# Patient Record
Sex: FEMALE | Race: WHITE | NOT HISPANIC OR LATINO | Employment: OTHER | RURAL
[De-identification: names, ages, dates, MRNs, and addresses within clinical notes are randomized per-mention and may not be internally consistent; named-entity substitution may affect disease eponyms.]

---

## 2021-01-27 ENCOUNTER — HISTORICAL (OUTPATIENT)
Dept: ADMINISTRATIVE | Facility: HOSPITAL | Age: 76
End: 2021-01-27

## 2021-02-24 ENCOUNTER — HISTORICAL (OUTPATIENT)
Dept: ADMINISTRATIVE | Facility: HOSPITAL | Age: 76
End: 2021-02-24

## 2021-06-14 ENCOUNTER — OFFICE VISIT (OUTPATIENT)
Dept: FAMILY MEDICINE | Facility: CLINIC | Age: 76
End: 2021-06-14
Payer: MEDICARE

## 2021-06-14 ENCOUNTER — APPOINTMENT (OUTPATIENT)
Dept: RADIOLOGY | Facility: CLINIC | Age: 76
End: 2021-06-14
Attending: NURSE PRACTITIONER
Payer: MEDICARE

## 2021-06-14 VITALS
SYSTOLIC BLOOD PRESSURE: 138 MMHG | OXYGEN SATURATION: 97 % | WEIGHT: 112.63 LBS | HEART RATE: 100 BPM | RESPIRATION RATE: 22 BRPM | HEIGHT: 62 IN | BODY MASS INDEX: 20.73 KG/M2 | DIASTOLIC BLOOD PRESSURE: 80 MMHG | TEMPERATURE: 98 F

## 2021-06-14 DIAGNOSIS — F41.9 ANXIETY: ICD-10-CM

## 2021-06-14 DIAGNOSIS — R05.9 COUGH: ICD-10-CM

## 2021-06-14 DIAGNOSIS — R07.81 PLEURITIC CHEST PAIN: ICD-10-CM

## 2021-06-14 DIAGNOSIS — J44.9 CHRONIC OBSTRUCTIVE PULMONARY DISEASE, UNSPECIFIED COPD TYPE: ICD-10-CM

## 2021-06-14 DIAGNOSIS — J44.89 COPD WITH CHRONIC BRONCHITIS AND EMPHYSEMA: ICD-10-CM

## 2021-06-14 DIAGNOSIS — J22 LRTI (LOWER RESPIRATORY TRACT INFECTION): ICD-10-CM

## 2021-06-14 DIAGNOSIS — J43.9 COPD WITH CHRONIC BRONCHITIS AND EMPHYSEMA: ICD-10-CM

## 2021-06-14 DIAGNOSIS — F17.210 NICOTINE DEPENDENCE, CIGARETTES, UNCOMPLICATED: ICD-10-CM

## 2021-06-14 DIAGNOSIS — E78.00 PURE HYPERCHOLESTEROLEMIA: Primary | ICD-10-CM

## 2021-06-14 PROCEDURE — 99214 PR OFFICE/OUTPT VISIT, EST, LEVL IV, 30-39 MIN: ICD-10-PCS | Mod: 25,,, | Performed by: NURSE PRACTITIONER

## 2021-06-14 PROCEDURE — 71046 X-RAY EXAM CHEST 2 VIEWS: CPT | Mod: TC,RHCUB,FY | Performed by: NURSE PRACTITIONER

## 2021-06-14 PROCEDURE — 96372 THER/PROPH/DIAG INJ SC/IM: CPT | Mod: ,,, | Performed by: NURSE PRACTITIONER

## 2021-06-14 PROCEDURE — 99214 OFFICE O/P EST MOD 30 MIN: CPT | Mod: 25,,, | Performed by: NURSE PRACTITIONER

## 2021-06-14 PROCEDURE — 96372 PR INJECTION,THERAP/PROPH/DIAG2ST, IM OR SUBCUT: ICD-10-PCS | Mod: ,,, | Performed by: NURSE PRACTITIONER

## 2021-06-14 RX ORDER — ALBUTEROL SULFATE 90 UG/1
2 AEROSOL, METERED RESPIRATORY (INHALATION) EVERY 4 HOURS PRN
Qty: 18 G | Refills: 1 | Status: SHIPPED | OUTPATIENT
Start: 2021-06-14 | End: 2022-09-07 | Stop reason: SDUPTHER

## 2021-06-14 RX ORDER — PREDNISONE 10 MG/1
3 TABLET ORAL EVERY MORNING
COMMUNITY
Start: 2021-04-27 | End: 2021-07-19 | Stop reason: ALTCHOICE

## 2021-06-14 RX ORDER — PRAVASTATIN SODIUM 20 MG/1
1 TABLET ORAL NIGHTLY
COMMUNITY
Start: 2021-03-08 | End: 2021-06-14 | Stop reason: SDUPTHER

## 2021-06-14 RX ORDER — VEDOLIZUMAB 300 MG/5ML
300 INJECTION, POWDER, LYOPHILIZED, FOR SOLUTION INTRAVENOUS
COMMUNITY
End: 2021-12-20

## 2021-06-14 RX ORDER — UMECLIDINIUM BROMIDE AND VILANTEROL TRIFENATATE 62.5; 25 UG/1; UG/1
1 POWDER RESPIRATORY (INHALATION) DAILY
Qty: 3 EACH | Refills: 3 | Status: SHIPPED | OUTPATIENT
Start: 2021-06-14 | End: 2021-12-20 | Stop reason: SDUPTHER

## 2021-06-14 RX ORDER — GABAPENTIN 300 MG/1
1 CAPSULE ORAL 3 TIMES DAILY
COMMUNITY
Start: 2021-05-20 | End: 2021-08-23 | Stop reason: SDUPTHER

## 2021-06-14 RX ORDER — ALBUTEROL SULFATE 90 UG/1
2 AEROSOL, METERED RESPIRATORY (INHALATION) EVERY 4 HOURS PRN
COMMUNITY
End: 2021-06-14 | Stop reason: SDUPTHER

## 2021-06-14 RX ORDER — CEFTRIAXONE 1 G/1
1 INJECTION, POWDER, FOR SOLUTION INTRAMUSCULAR; INTRAVENOUS
Status: COMPLETED | OUTPATIENT
Start: 2021-06-14 | End: 2021-06-14

## 2021-06-14 RX ORDER — PRAVASTATIN SODIUM 20 MG/1
20 TABLET ORAL NIGHTLY
Qty: 90 TABLET | Refills: 3 | Status: SHIPPED | OUTPATIENT
Start: 2021-06-14 | End: 2021-12-20 | Stop reason: SDUPTHER

## 2021-06-14 RX ORDER — AZITHROMYCIN 250 MG/1
TABLET, FILM COATED ORAL
Qty: 6 TABLET | Refills: 0 | Status: SHIPPED | OUTPATIENT
Start: 2021-06-14 | End: 2021-07-19 | Stop reason: ALTCHOICE

## 2021-06-14 RX ORDER — UMECLIDINIUM BROMIDE AND VILANTEROL TRIFENATATE 62.5; 25 UG/1; UG/1
1 POWDER RESPIRATORY (INHALATION) EVERY 4 HOURS
COMMUNITY
Start: 2021-04-27 | End: 2021-06-14 | Stop reason: SDUPTHER

## 2021-06-14 RX ORDER — DEXAMETHASONE SODIUM PHOSPHATE 4 MG/ML
6 INJECTION, SOLUTION INTRA-ARTICULAR; INTRALESIONAL; INTRAMUSCULAR; INTRAVENOUS; SOFT TISSUE
Status: COMPLETED | OUTPATIENT
Start: 2021-06-14 | End: 2021-06-14

## 2021-06-14 RX ADMIN — DEXAMETHASONE SODIUM PHOSPHATE 6 MG: 4 INJECTION, SOLUTION INTRA-ARTICULAR; INTRALESIONAL; INTRAMUSCULAR; INTRAVENOUS; SOFT TISSUE at 04:06

## 2021-06-14 RX ADMIN — CEFTRIAXONE 1 G: 1 INJECTION, POWDER, FOR SOLUTION INTRAMUSCULAR; INTRAVENOUS at 04:06

## 2021-07-14 ENCOUNTER — OFFICE VISIT (OUTPATIENT)
Dept: SPINE | Facility: CLINIC | Age: 76
End: 2021-07-14
Payer: MEDICARE

## 2021-07-14 DIAGNOSIS — M16.12 ARTHRITIS OF LEFT HIP: ICD-10-CM

## 2021-07-14 DIAGNOSIS — M16.11 ARTHRITIS OF RIGHT HIP: ICD-10-CM

## 2021-07-14 DIAGNOSIS — M54.16 LUMBAR RADICULOPATHY: Primary | ICD-10-CM

## 2021-07-14 DIAGNOSIS — M54.9 DORSALGIA, UNSPECIFIED: ICD-10-CM

## 2021-07-14 DIAGNOSIS — M51.36 DDD (DEGENERATIVE DISC DISEASE), LUMBAR: ICD-10-CM

## 2021-07-14 PROCEDURE — 99214 OFFICE O/P EST MOD 30 MIN: CPT | Mod: S$PBB,,, | Performed by: ORTHOPAEDIC SURGERY

## 2021-07-14 PROCEDURE — 99213 OFFICE O/P EST LOW 20 MIN: CPT | Mod: PBBFAC | Performed by: ORTHOPAEDIC SURGERY

## 2021-07-14 PROCEDURE — 99214 PR OFFICE/OUTPT VISIT, EST, LEVL IV, 30-39 MIN: ICD-10-PCS | Mod: S$PBB,,, | Performed by: ORTHOPAEDIC SURGERY

## 2021-07-19 ENCOUNTER — HOSPITAL ENCOUNTER (OUTPATIENT)
Dept: RADIOLOGY | Facility: HOSPITAL | Age: 76
Discharge: HOME | End: 2021-07-19
Attending: ORTHOPAEDIC SURGERY
Payer: MEDICARE

## 2021-07-19 VITALS
TEMPERATURE: 98 F | BODY MASS INDEX: 20.98 KG/M2 | OXYGEN SATURATION: 97 % | DIASTOLIC BLOOD PRESSURE: 74 MMHG | RESPIRATION RATE: 18 BRPM | SYSTOLIC BLOOD PRESSURE: 155 MMHG | HEIGHT: 62 IN | WEIGHT: 114 LBS | HEART RATE: 71 BPM

## 2021-07-19 DIAGNOSIS — M54.16 LUMBAR RADICULOPATHY: ICD-10-CM

## 2021-07-19 DIAGNOSIS — M54.9 DORSALGIA, UNSPECIFIED: ICD-10-CM

## 2021-07-19 LAB
APTT PPP: 25.8 SECONDS (ref 25.2–37.3)
BASOPHILS # BLD AUTO: 0.09 K/UL (ref 0–0.2)
BASOPHILS NFR BLD AUTO: 1 % (ref 0–1)
DIFFERENTIAL METHOD BLD: ABNORMAL
EOSINOPHIL # BLD AUTO: 0.33 K/UL (ref 0–0.5)
EOSINOPHIL NFR BLD AUTO: 3.8 % (ref 1–4)
ERYTHROCYTE [DISTWIDTH] IN BLOOD BY AUTOMATED COUNT: 13.3 % (ref 11.5–14.5)
HCT VFR BLD AUTO: 41 % (ref 38–47)
HGB BLD-MCNC: 13.5 G/DL (ref 12–16)
IMM GRANULOCYTES # BLD AUTO: 0.02 K/UL (ref 0–0.04)
IMM GRANULOCYTES NFR BLD: 0.2 % (ref 0–0.4)
INR BLD: 0.97 (ref 0.9–1.1)
LYMPHOCYTES # BLD AUTO: 2.03 K/UL (ref 1–4.8)
LYMPHOCYTES NFR BLD AUTO: 23.3 % (ref 27–41)
MCH RBC QN AUTO: 31.8 PG (ref 27–31)
MCHC RBC AUTO-ENTMCNC: 32.9 G/DL (ref 32–36)
MCV RBC AUTO: 96.5 FL (ref 80–96)
MONOCYTES # BLD AUTO: 0.73 K/UL (ref 0–0.8)
MONOCYTES NFR BLD AUTO: 8.4 % (ref 2–6)
MPC BLD CALC-MCNC: 9.7 FL (ref 9.4–12.4)
NEUTROPHILS # BLD AUTO: 5.51 K/UL (ref 1.8–7.7)
NEUTROPHILS NFR BLD AUTO: 63.3 % (ref 53–65)
NRBC # BLD AUTO: 0 X10E3/UL
NRBC, AUTO (.00): 0 %
PLATELET # BLD AUTO: 272 K/UL (ref 150–400)
PROTHROMBIN TIME: 12.9 SECONDS (ref 11.7–14.7)
RBC # BLD AUTO: 4.25 M/UL (ref 4.2–5.4)
WBC # BLD AUTO: 8.71 K/UL (ref 4.5–11)

## 2021-07-19 PROCEDURE — 85610 PROTHROMBIN TIME: CPT | Performed by: RADIOLOGY

## 2021-07-19 PROCEDURE — 25000003 PHARM REV CODE 250: Performed by: RADIOLOGY

## 2021-07-19 PROCEDURE — 72132 CT LUMBAR SPINE W/DYE: CPT | Mod: TC

## 2021-07-19 PROCEDURE — 72132 CT LUMBAR SPINE WITH CONTRAST: ICD-10-PCS | Mod: 26,,, | Performed by: RADIOLOGY

## 2021-07-19 PROCEDURE — 25500020 PHARM REV CODE 255: Performed by: ORTHOPAEDIC SURGERY

## 2021-07-19 PROCEDURE — 36415 COLL VENOUS BLD VENIPUNCTURE: CPT | Performed by: RADIOLOGY

## 2021-07-19 PROCEDURE — 85025 COMPLETE CBC W/AUTO DIFF WBC: CPT | Performed by: RADIOLOGY

## 2021-07-19 PROCEDURE — 72132 CT LUMBAR SPINE W/DYE: CPT | Mod: 26,,, | Performed by: RADIOLOGY

## 2021-07-19 PROCEDURE — 85730 THROMBOPLASTIN TIME PARTIAL: CPT | Performed by: RADIOLOGY

## 2021-07-19 PROCEDURE — 62304 MYELOGRAPHY LUMBAR INJECTION: CPT

## 2021-07-19 RX ORDER — IOPAMIDOL 408 MG/ML
10 INJECTION, SOLUTION INTRATHECAL
Status: COMPLETED | OUTPATIENT
Start: 2021-07-19 | End: 2021-07-19

## 2021-07-19 RX ORDER — DIAZEPAM 5 MG/1
5 TABLET ORAL ONCE
Status: COMPLETED | OUTPATIENT
Start: 2021-07-19 | End: 2021-07-19

## 2021-07-19 RX ORDER — SODIUM CHLORIDE 450 MG/100ML
INJECTION, SOLUTION INTRAVENOUS ONCE
Status: COMPLETED | OUTPATIENT
Start: 2021-07-19 | End: 2021-07-19

## 2021-07-19 RX ORDER — LISINOPRIL 40 MG/1
40 TABLET ORAL DAILY
COMMUNITY
End: 2021-08-20 | Stop reason: SDUPTHER

## 2021-07-19 RX ADMIN — IOPAMIDOL 10 ML: 408 INJECTION, SOLUTION INTRATHECAL at 11:07

## 2021-07-19 RX ADMIN — SODIUM CHLORIDE: 4.5 INJECTION, SOLUTION INTRAVENOUS at 08:07

## 2021-07-19 RX ADMIN — DIAZEPAM 5 MG: 5 TABLET ORAL at 09:07

## 2021-07-26 ENCOUNTER — OFFICE VISIT (OUTPATIENT)
Dept: SPINE | Facility: CLINIC | Age: 76
End: 2021-07-26
Payer: MEDICARE

## 2021-07-26 DIAGNOSIS — M54.16 LUMBAR RADICULOPATHY: Primary | ICD-10-CM

## 2021-07-26 DIAGNOSIS — M16.11 ARTHRITIS OF RIGHT HIP: ICD-10-CM

## 2021-07-26 DIAGNOSIS — M51.36 DDD (DEGENERATIVE DISC DISEASE), LUMBAR: ICD-10-CM

## 2021-07-26 DIAGNOSIS — M16.12 ARTHRITIS OF LEFT HIP: ICD-10-CM

## 2021-07-26 PROCEDURE — 99214 OFFICE O/P EST MOD 30 MIN: CPT | Mod: 25,S$PBB,, | Performed by: ORTHOPAEDIC SURGERY

## 2021-07-26 PROCEDURE — 99406 PR TOBACCO USE CESSATION INTERMEDIATE 3-10 MINUTES: ICD-10-PCS | Mod: S$PBB,,, | Performed by: ORTHOPAEDIC SURGERY

## 2021-07-26 PROCEDURE — 99214 PR OFFICE/OUTPT VISIT, EST, LEVL IV, 30-39 MIN: ICD-10-PCS | Mod: 25,S$PBB,, | Performed by: ORTHOPAEDIC SURGERY

## 2021-07-26 PROCEDURE — 99406 BEHAV CHNG SMOKING 3-10 MIN: CPT | Mod: S$PBB,,, | Performed by: ORTHOPAEDIC SURGERY

## 2021-07-26 PROCEDURE — 99213 OFFICE O/P EST LOW 20 MIN: CPT | Mod: PBBFAC | Performed by: ORTHOPAEDIC SURGERY

## 2021-08-22 RX ORDER — LISINOPRIL 40 MG/1
40 TABLET ORAL DAILY
Qty: 90 TABLET | Refills: 0 | Status: SHIPPED | OUTPATIENT
Start: 2021-08-22 | End: 2021-12-20 | Stop reason: SDUPTHER

## 2021-08-23 RX ORDER — GABAPENTIN 300 MG/1
300 CAPSULE ORAL 3 TIMES DAILY
Qty: 90 CAPSULE | Refills: 11 | Status: SHIPPED | OUTPATIENT
Start: 2021-08-23 | End: 2022-10-25 | Stop reason: SDUPTHER

## 2021-08-28 ENCOUNTER — INFUSION (OUTPATIENT)
Dept: INFECTIOUS DISEASES | Facility: HOSPITAL | Age: 76
End: 2021-08-28
Attending: NURSE PRACTITIONER
Payer: MEDICARE

## 2021-08-28 VITALS
OXYGEN SATURATION: 98 % | RESPIRATION RATE: 18 BRPM | HEART RATE: 82 BPM | TEMPERATURE: 98 F | SYSTOLIC BLOOD PRESSURE: 138 MMHG | DIASTOLIC BLOOD PRESSURE: 78 MMHG

## 2021-08-28 DIAGNOSIS — U07.1 COVID-19: Primary | ICD-10-CM

## 2021-08-28 PROCEDURE — 25000003 PHARM REV CODE 250: Performed by: NURSE PRACTITIONER

## 2021-08-28 PROCEDURE — M0243 CASIRIVI AND IMDEVI INFUSION: HCPCS | Performed by: NURSE PRACTITIONER

## 2021-08-28 PROCEDURE — 63600175 PHARM REV CODE 636 W HCPCS: Performed by: NURSE PRACTITIONER

## 2021-08-28 RX ORDER — ALBUTEROL SULFATE 90 UG/1
2 AEROSOL, METERED RESPIRATORY (INHALATION)
Status: DISCONTINUED | OUTPATIENT
Start: 2021-08-28 | End: 2021-12-20 | Stop reason: ALTCHOICE

## 2021-08-28 RX ORDER — EPINEPHRINE 0.3 MG/.3ML
0.3 INJECTION SUBCUTANEOUS
Status: DISCONTINUED | OUTPATIENT
Start: 2021-08-28 | End: 2021-12-20 | Stop reason: ALTCHOICE

## 2021-08-28 RX ORDER — ONDANSETRON 4 MG/1
4 TABLET, ORALLY DISINTEGRATING ORAL ONCE AS NEEDED
Status: DISCONTINUED | OUTPATIENT
Start: 2021-08-28 | End: 2021-12-20 | Stop reason: ALTCHOICE

## 2021-08-28 RX ORDER — ACETAMINOPHEN 325 MG/1
650 TABLET ORAL ONCE AS NEEDED
Status: DISCONTINUED | OUTPATIENT
Start: 2021-08-28 | End: 2021-12-20 | Stop reason: ALTCHOICE

## 2021-08-28 RX ORDER — DIPHENHYDRAMINE HYDROCHLORIDE 50 MG/ML
25 INJECTION INTRAMUSCULAR; INTRAVENOUS ONCE AS NEEDED
Status: DISCONTINUED | OUTPATIENT
Start: 2021-08-28 | End: 2021-12-20 | Stop reason: ALTCHOICE

## 2021-08-28 RX ORDER — SODIUM CHLORIDE 0.9 % (FLUSH) 0.9 %
10 SYRINGE (ML) INJECTION
Status: DISCONTINUED | OUTPATIENT
Start: 2021-08-28 | End: 2021-12-20 | Stop reason: ALTCHOICE

## 2021-08-28 RX ADMIN — CASIRIVIMAB AND IMDEVIMAB 600 MG: 600; 600 INJECTION, SOLUTION, CONCENTRATE INTRAVENOUS at 12:08

## 2021-09-15 ENCOUNTER — TELEPHONE (OUTPATIENT)
Dept: FAMILY MEDICINE | Facility: CLINIC | Age: 76
End: 2021-09-15

## 2021-09-23 ENCOUNTER — OFFICE VISIT (OUTPATIENT)
Dept: FAMILY MEDICINE | Facility: CLINIC | Age: 76
End: 2021-09-23
Payer: MEDICARE

## 2021-09-23 VITALS
HEART RATE: 102 BPM | SYSTOLIC BLOOD PRESSURE: 138 MMHG | HEIGHT: 62 IN | TEMPERATURE: 99 F | OXYGEN SATURATION: 98 % | DIASTOLIC BLOOD PRESSURE: 84 MMHG | BODY MASS INDEX: 20.98 KG/M2 | WEIGHT: 114 LBS | RESPIRATION RATE: 18 BRPM

## 2021-09-23 DIAGNOSIS — L02.11 CUTANEOUS ABSCESS OF NECK: Primary | ICD-10-CM

## 2021-09-23 DIAGNOSIS — Z76.89 ENCOUNTER FOR INCISION AND DRAINAGE PROCEDURE: ICD-10-CM

## 2021-09-23 PROCEDURE — 99213 PR OFFICE/OUTPT VISIT, EST, LEVL III, 20-29 MIN: ICD-10-PCS | Mod: ,,, | Performed by: NURSE PRACTITIONER

## 2021-09-23 PROCEDURE — 99213 OFFICE O/P EST LOW 20 MIN: CPT | Mod: ,,, | Performed by: NURSE PRACTITIONER

## 2021-09-23 RX ORDER — SULFAMETHOXAZOLE AND TRIMETHOPRIM 400; 80 MG/1; MG/1
1 TABLET ORAL 2 TIMES DAILY
Qty: 20 TABLET | Refills: 0 | Status: SHIPPED | OUTPATIENT
Start: 2021-09-23 | End: 2021-12-20 | Stop reason: ALTCHOICE

## 2021-09-23 RX ORDER — MUPIROCIN 20 MG/G
OINTMENT TOPICAL 3 TIMES DAILY
Qty: 22 G | Refills: 2 | Status: SHIPPED | OUTPATIENT
Start: 2021-09-23 | End: 2021-12-20 | Stop reason: ALTCHOICE

## 2021-09-23 RX ORDER — LIDOCAINE HYDROCHLORIDE 10 MG/ML
5 INJECTION, SOLUTION EPIDURAL; INFILTRATION; INTRACAUDAL; PERINEURAL
Status: COMPLETED | OUTPATIENT
Start: 2021-09-23 | End: 2021-09-23

## 2021-09-23 RX ADMIN — LIDOCAINE HYDROCHLORIDE 50 MG: 10 INJECTION, SOLUTION EPIDURAL; INFILTRATION; INTRACAUDAL; PERINEURAL at 08:09

## 2021-10-01 ENCOUNTER — OFFICE VISIT (OUTPATIENT)
Dept: FAMILY MEDICINE | Facility: CLINIC | Age: 76
End: 2021-10-01
Payer: MEDICARE

## 2021-10-01 VITALS
DIASTOLIC BLOOD PRESSURE: 65 MMHG | RESPIRATION RATE: 20 BRPM | HEART RATE: 95 BPM | BODY MASS INDEX: 20.8 KG/M2 | OXYGEN SATURATION: 95 % | HEIGHT: 62 IN | TEMPERATURE: 98 F | SYSTOLIC BLOOD PRESSURE: 93 MMHG | WEIGHT: 113 LBS

## 2021-10-01 DIAGNOSIS — L72.9 INFECTED CYST OF SKIN: Primary | ICD-10-CM

## 2021-10-01 DIAGNOSIS — L08.9 INFECTED CYST OF SKIN: Primary | ICD-10-CM

## 2021-10-01 PROCEDURE — 99214 PR OFFICE/OUTPT VISIT, EST, LEVL IV, 30-39 MIN: ICD-10-PCS | Mod: 25,,, | Performed by: FAMILY MEDICINE

## 2021-10-01 PROCEDURE — 99214 OFFICE O/P EST MOD 30 MIN: CPT | Mod: 25,,, | Performed by: FAMILY MEDICINE

## 2021-10-01 PROCEDURE — 96372 THER/PROPH/DIAG INJ SC/IM: CPT | Mod: ,,, | Performed by: FAMILY MEDICINE

## 2021-10-01 PROCEDURE — 96372 PR INJECTION,THERAP/PROPH/DIAG2ST, IM OR SUBCUT: ICD-10-PCS | Mod: ,,, | Performed by: FAMILY MEDICINE

## 2021-10-01 RX ORDER — CLINDAMYCIN PHOSPHATE 150 MG/ML
300 INJECTION, SOLUTION INTRAVENOUS
Status: COMPLETED | OUTPATIENT
Start: 2021-10-01 | End: 2021-10-01

## 2021-10-01 RX ORDER — FLUCONAZOLE 150 MG/1
150 TABLET ORAL WEEKLY
Qty: 2 TABLET | Refills: 0 | Status: SHIPPED | OUTPATIENT
Start: 2021-10-01 | End: 2021-10-08

## 2021-10-01 RX ORDER — CLINDAMYCIN HYDROCHLORIDE 300 MG/1
300 CAPSULE ORAL 3 TIMES DAILY
Qty: 21 CAPSULE | Refills: 0 | Status: SHIPPED | OUTPATIENT
Start: 2021-10-01 | End: 2021-10-08

## 2021-10-01 RX ADMIN — CLINDAMYCIN PHOSPHATE 300 MG: 150 INJECTION, SOLUTION INTRAVENOUS at 02:10

## 2021-10-12 ENCOUNTER — OFFICE VISIT (OUTPATIENT)
Dept: SURGERY | Facility: CLINIC | Age: 76
End: 2021-10-12
Attending: STUDENT IN AN ORGANIZED HEALTH CARE EDUCATION/TRAINING PROGRAM
Payer: MEDICARE

## 2021-10-12 VITALS — BODY MASS INDEX: 20.43 KG/M2 | WEIGHT: 111 LBS | HEIGHT: 62 IN

## 2021-10-12 DIAGNOSIS — L08.9 INFECTED CYST OF SKIN: ICD-10-CM

## 2021-10-12 DIAGNOSIS — L72.9 INFECTED CYST OF SKIN: ICD-10-CM

## 2021-10-12 PROCEDURE — 99214 OFFICE O/P EST MOD 30 MIN: CPT | Mod: PBBFAC | Performed by: STUDENT IN AN ORGANIZED HEALTH CARE EDUCATION/TRAINING PROGRAM

## 2021-10-12 PROCEDURE — 99204 PR OFFICE/OUTPT VISIT, NEW, LEVL IV, 45-59 MIN: ICD-10-PCS | Mod: S$PBB,,, | Performed by: STUDENT IN AN ORGANIZED HEALTH CARE EDUCATION/TRAINING PROGRAM

## 2021-10-12 PROCEDURE — 99204 OFFICE O/P NEW MOD 45 MIN: CPT | Mod: S$PBB,,, | Performed by: STUDENT IN AN ORGANIZED HEALTH CARE EDUCATION/TRAINING PROGRAM

## 2021-12-20 ENCOUNTER — APPOINTMENT (OUTPATIENT)
Dept: RADIOLOGY | Facility: CLINIC | Age: 76
End: 2021-12-20
Attending: NURSE PRACTITIONER
Payer: MEDICARE

## 2021-12-20 ENCOUNTER — OFFICE VISIT (OUTPATIENT)
Dept: FAMILY MEDICINE | Facility: CLINIC | Age: 76
End: 2021-12-20
Payer: MEDICARE

## 2021-12-20 VITALS
SYSTOLIC BLOOD PRESSURE: 118 MMHG | OXYGEN SATURATION: 98 % | RESPIRATION RATE: 20 BRPM | HEART RATE: 98 BPM | TEMPERATURE: 97 F | HEIGHT: 62 IN | BODY MASS INDEX: 21.05 KG/M2 | DIASTOLIC BLOOD PRESSURE: 70 MMHG | WEIGHT: 114.38 LBS

## 2021-12-20 DIAGNOSIS — I10 PRIMARY HYPERTENSION: Primary | ICD-10-CM

## 2021-12-20 DIAGNOSIS — M54.9 UPPER BACK PAIN: ICD-10-CM

## 2021-12-20 DIAGNOSIS — E78.00 PURE HYPERCHOLESTEROLEMIA: ICD-10-CM

## 2021-12-20 DIAGNOSIS — Z11.59 NEED FOR HEPATITIS C SCREENING TEST: ICD-10-CM

## 2021-12-20 DIAGNOSIS — Z79.899 ENCOUNTER FOR LONG-TERM (CURRENT) USE OF OTHER MEDICATIONS: ICD-10-CM

## 2021-12-20 DIAGNOSIS — J44.89 COPD WITH CHRONIC BRONCHITIS AND EMPHYSEMA: ICD-10-CM

## 2021-12-20 DIAGNOSIS — F17.210 NICOTINE DEPENDENCE, CIGARETTES, UNCOMPLICATED: ICD-10-CM

## 2021-12-20 DIAGNOSIS — R35.0 URINARY FREQUENCY: ICD-10-CM

## 2021-12-20 DIAGNOSIS — J43.9 COPD WITH CHRONIC BRONCHITIS AND EMPHYSEMA: ICD-10-CM

## 2021-12-20 DIAGNOSIS — N30.90 CYSTITIS: ICD-10-CM

## 2021-12-20 LAB
ALBUMIN SERPL BCP-MCNC: 3.7 G/DL (ref 3.5–5)
ALBUMIN/GLOB SERPL: 1.1 {RATIO}
ALP SERPL-CCNC: 78 U/L (ref 55–142)
ALT SERPL W P-5'-P-CCNC: 19 U/L (ref 13–56)
ANION GAP SERPL CALCULATED.3IONS-SCNC: 5 MMOL/L (ref 7–16)
AST SERPL W P-5'-P-CCNC: 16 U/L (ref 15–37)
BASOPHILS # BLD AUTO: 0.07 K/UL (ref 0–0.2)
BASOPHILS NFR BLD AUTO: 0.8 % (ref 0–1)
BILIRUB SERPL-MCNC: 0.3 MG/DL (ref 0–1.2)
BILIRUB UR QL STRIP: NEGATIVE
BUN SERPL-MCNC: 22 MG/DL (ref 7–18)
BUN/CREAT SERPL: 32 (ref 6–20)
CALCIUM SERPL-MCNC: 9.7 MG/DL (ref 8.5–10.1)
CHLORIDE SERPL-SCNC: 108 MMOL/L (ref 98–107)
CHOLEST SERPL-MCNC: 207 MG/DL (ref 0–200)
CHOLEST/HDLC SERPL: 3.8 {RATIO}
CLARITY UR: CLEAR
CO2 SERPL-SCNC: 34 MMOL/L (ref 21–32)
COLOR UR: YELLOW
CREAT SERPL-MCNC: 0.68 MG/DL (ref 0.55–1.02)
DIFFERENTIAL METHOD BLD: ABNORMAL
EOSINOPHIL # BLD AUTO: 0.13 K/UL (ref 0–0.5)
EOSINOPHIL NFR BLD AUTO: 1.5 % (ref 1–4)
ERYTHROCYTE [DISTWIDTH] IN BLOOD BY AUTOMATED COUNT: 13.1 % (ref 11.5–14.5)
GLOBULIN SER-MCNC: 3.5 G/DL (ref 2–4)
GLUCOSE SERPL-MCNC: 90 MG/DL (ref 74–106)
GLUCOSE UR STRIP-MCNC: NEGATIVE MG/DL
HCT VFR BLD AUTO: 43.4 % (ref 38–47)
HCV AB SER QL: NORMAL
HDLC SERPL-MCNC: 54 MG/DL (ref 40–60)
HGB BLD-MCNC: 14.1 G/DL (ref 12–16)
IMM GRANULOCYTES # BLD AUTO: 0.03 K/UL (ref 0–0.04)
IMM GRANULOCYTES NFR BLD: 0.3 % (ref 0–0.4)
KETONES UR STRIP-SCNC: NEGATIVE MG/DL
LDLC SERPL CALC-MCNC: 126 MG/DL
LDLC/HDLC SERPL: 2.3 {RATIO}
LEUKOCYTE ESTERASE UR QL STRIP: NEGATIVE
LYMPHOCYTES # BLD AUTO: 2.61 K/UL (ref 1–4.8)
LYMPHOCYTES NFR BLD AUTO: 29.9 % (ref 27–41)
MCH RBC QN AUTO: 31.4 PG (ref 27–31)
MCHC RBC AUTO-ENTMCNC: 32.5 G/DL (ref 32–36)
MCV RBC AUTO: 96.7 FL (ref 80–96)
MONOCYTES # BLD AUTO: 0.55 K/UL (ref 0–0.8)
MONOCYTES NFR BLD AUTO: 6.3 % (ref 2–6)
MPC BLD CALC-MCNC: 12.7 FL (ref 9.4–12.4)
NEUTROPHILS # BLD AUTO: 5.35 K/UL (ref 1.8–7.7)
NEUTROPHILS NFR BLD AUTO: 61.2 % (ref 53–65)
NITRITE UR QL STRIP: NEGATIVE
NONHDLC SERPL-MCNC: 153 MG/DL
NRBC # BLD AUTO: 0 X10E3/UL
NRBC, AUTO (.00): 0 %
PH UR STRIP: 7 PH UNITS
PLATELET # BLD AUTO: 278 K/UL (ref 150–400)
POTASSIUM SERPL-SCNC: 4.1 MMOL/L (ref 3.5–5.1)
PROT SERPL-MCNC: 7.2 G/DL (ref 6.4–8.2)
PROT UR QL STRIP: NEGATIVE
RBC # BLD AUTO: 4.49 M/UL (ref 4.2–5.4)
RBC # UR STRIP: NEGATIVE /UL
SODIUM SERPL-SCNC: 143 MMOL/L (ref 136–145)
SP GR UR STRIP: 1.01
TRIGL SERPL-MCNC: 136 MG/DL (ref 35–150)
TSH SERPL DL<=0.005 MIU/L-ACNC: 1.1 UIU/ML (ref 0.36–3.74)
UROBILINOGEN UR STRIP-ACNC: 0.2 MG/DL
VLDLC SERPL-MCNC: 27 MG/DL
WBC # BLD AUTO: 8.74 K/UL (ref 4.5–11)

## 2021-12-20 PROCEDURE — 81003 URINALYSIS AUTO W/O SCOPE: CPT | Mod: QW,,, | Performed by: CLINICAL MEDICAL LABORATORY

## 2021-12-20 PROCEDURE — 99214 PR OFFICE/OUTPT VISIT, EST, LEVL IV, 30-39 MIN: ICD-10-PCS | Mod: ,,, | Performed by: NURSE PRACTITIONER

## 2021-12-20 PROCEDURE — 85025 CBC WITH DIFFERENTIAL: ICD-10-PCS | Mod: ,,, | Performed by: CLINICAL MEDICAL LABORATORY

## 2021-12-20 PROCEDURE — 80061 LIPID PANEL: ICD-10-PCS | Mod: ,,, | Performed by: CLINICAL MEDICAL LABORATORY

## 2021-12-20 PROCEDURE — 80061 LIPID PANEL: CPT | Mod: ,,, | Performed by: CLINICAL MEDICAL LABORATORY

## 2021-12-20 PROCEDURE — 80053 COMPREHEN METABOLIC PANEL: CPT | Mod: ,,, | Performed by: CLINICAL MEDICAL LABORATORY

## 2021-12-20 PROCEDURE — 84443 ASSAY THYROID STIM HORMONE: CPT | Mod: ,,, | Performed by: CLINICAL MEDICAL LABORATORY

## 2021-12-20 PROCEDURE — 80053 COMPREHENSIVE METABOLIC PANEL: ICD-10-PCS | Mod: ,,, | Performed by: CLINICAL MEDICAL LABORATORY

## 2021-12-20 PROCEDURE — 81003 URINALYSIS, REFLEX TO URINE CULTURE: ICD-10-PCS | Mod: QW,,, | Performed by: CLINICAL MEDICAL LABORATORY

## 2021-12-20 PROCEDURE — 86803 HEPATITIS C AB TEST: CPT | Mod: ,,, | Performed by: CLINICAL MEDICAL LABORATORY

## 2021-12-20 PROCEDURE — 71046 X-RAY EXAM CHEST 2 VIEWS: CPT | Mod: TC,RHCUB,FY | Performed by: NURSE PRACTITIONER

## 2021-12-20 PROCEDURE — 86803 HEPATITIS C ANTIBODY: ICD-10-PCS | Mod: ,,, | Performed by: CLINICAL MEDICAL LABORATORY

## 2021-12-20 PROCEDURE — 99214 OFFICE O/P EST MOD 30 MIN: CPT | Mod: ,,, | Performed by: NURSE PRACTITIONER

## 2021-12-20 PROCEDURE — 85025 COMPLETE CBC W/AUTO DIFF WBC: CPT | Mod: ,,, | Performed by: CLINICAL MEDICAL LABORATORY

## 2021-12-20 PROCEDURE — 84443 TSH: ICD-10-PCS | Mod: ,,, | Performed by: CLINICAL MEDICAL LABORATORY

## 2021-12-20 RX ORDER — PRAVASTATIN SODIUM 20 MG/1
20 TABLET ORAL NIGHTLY
Qty: 90 TABLET | Refills: 3 | Status: SHIPPED | OUTPATIENT
Start: 2021-12-20 | End: 2021-12-29 | Stop reason: DRUGHIGH

## 2021-12-20 RX ORDER — LISINOPRIL 40 MG/1
40 TABLET ORAL DAILY
Qty: 90 TABLET | Refills: 3 | Status: SHIPPED | OUTPATIENT
Start: 2021-12-20 | End: 2022-10-25 | Stop reason: ALTCHOICE

## 2021-12-20 RX ORDER — UMECLIDINIUM BROMIDE AND VILANTEROL TRIFENATATE 62.5; 25 UG/1; UG/1
1 POWDER RESPIRATORY (INHALATION) DAILY
Qty: 90 EACH | Refills: 3 | Status: SHIPPED | OUTPATIENT
Start: 2021-12-20 | End: 2022-09-07 | Stop reason: SDUPTHER

## 2021-12-20 RX ORDER — PROMETHAZINE HYDROCHLORIDE 25 MG/1
25 TABLET ORAL EVERY 6 HOURS PRN
Qty: 30 TABLET | Refills: 1 | Status: SHIPPED | OUTPATIENT
Start: 2021-12-20 | End: 2022-10-25 | Stop reason: SDUPTHER

## 2021-12-29 RX ORDER — PRAVASTATIN SODIUM 40 MG/1
40 TABLET ORAL DAILY
Qty: 90 TABLET | Refills: 3 | Status: SHIPPED | OUTPATIENT
Start: 2021-12-29 | End: 2022-10-25 | Stop reason: SDUPTHER

## 2022-03-11 DIAGNOSIS — Z71.89 COMPLEX CARE COORDINATION: ICD-10-CM

## 2022-09-07 DIAGNOSIS — J43.9 COPD WITH CHRONIC BRONCHITIS AND EMPHYSEMA: ICD-10-CM

## 2022-09-07 DIAGNOSIS — J44.89 COPD WITH CHRONIC BRONCHITIS AND EMPHYSEMA: ICD-10-CM

## 2022-09-07 RX ORDER — UMECLIDINIUM BROMIDE AND VILANTEROL TRIFENATATE 62.5; 25 UG/1; UG/1
1 POWDER RESPIRATORY (INHALATION) DAILY
Qty: 90 EACH | Refills: 0 | Status: SHIPPED | OUTPATIENT
Start: 2022-09-07 | End: 2022-10-25 | Stop reason: SDUPTHER

## 2022-09-07 RX ORDER — ALBUTEROL SULFATE 90 UG/1
2 AEROSOL, METERED RESPIRATORY (INHALATION) EVERY 4 HOURS PRN
Qty: 18 G | Refills: 0 | Status: SHIPPED | OUTPATIENT
Start: 2022-09-07 | End: 2022-10-25 | Stop reason: SDUPTHER

## 2022-09-07 NOTE — TELEPHONE ENCOUNTER
Pt requested. Last seen 12/20/21. Her appt on 6/29/22 was canceled and never rescheduled. I will send message to get appt made.

## 2022-09-07 NOTE — TELEPHONE ENCOUNTER
----- Message from Gwen Luque sent at 9/7/2022 10:36 AM CDT -----  Patient needs a refill on inhaler called into  [facility] pharmacy at [phone].  Please call patient at [phone] if you have any questions. Thanks!

## 2022-09-07 NOTE — TELEPHONE ENCOUNTER
Refill sent.    Her appt in June was canceled and never rescheduled. She needs appt to continue to get refills.

## 2022-10-09 DIAGNOSIS — Z71.89 COMPLEX CARE COORDINATION: ICD-10-CM

## 2022-10-25 ENCOUNTER — OFFICE VISIT (OUTPATIENT)
Dept: FAMILY MEDICINE | Facility: CLINIC | Age: 77
End: 2022-10-25
Payer: MEDICARE

## 2022-10-25 VITALS
DIASTOLIC BLOOD PRESSURE: 62 MMHG | RESPIRATION RATE: 18 BRPM | OXYGEN SATURATION: 98 % | HEART RATE: 78 BPM | SYSTOLIC BLOOD PRESSURE: 120 MMHG | BODY MASS INDEX: 18.8 KG/M2 | WEIGHT: 102.19 LBS | TEMPERATURE: 97 F | HEIGHT: 62 IN

## 2022-10-25 DIAGNOSIS — F41.9 ANXIETY: Chronic | ICD-10-CM

## 2022-10-25 DIAGNOSIS — Z79.899 ENCOUNTER FOR LONG-TERM (CURRENT) USE OF OTHER MEDICATIONS: ICD-10-CM

## 2022-10-25 DIAGNOSIS — M54.50 CHRONIC MIDLINE LOW BACK PAIN, UNSPECIFIED WHETHER SCIATICA PRESENT: Chronic | ICD-10-CM

## 2022-10-25 DIAGNOSIS — J44.89 COPD WITH CHRONIC BRONCHITIS AND EMPHYSEMA: Chronic | ICD-10-CM

## 2022-10-25 DIAGNOSIS — E78.00 HYPERCHOLESTEREMIA: Chronic | ICD-10-CM

## 2022-10-25 DIAGNOSIS — F17.210 NICOTINE DEPENDENCE, CIGARETTES, UNCOMPLICATED: Chronic | ICD-10-CM

## 2022-10-25 DIAGNOSIS — M51.9 LUMBAR DISC DISEASE: Chronic | ICD-10-CM

## 2022-10-25 DIAGNOSIS — K51.019 ULCERATIVE PANCOLITIS WITH COMPLICATION: Chronic | ICD-10-CM

## 2022-10-25 DIAGNOSIS — I10 BENIGN ESSENTIAL HYPERTENSION: Primary | Chronic | ICD-10-CM

## 2022-10-25 DIAGNOSIS — J43.9 COPD WITH CHRONIC BRONCHITIS AND EMPHYSEMA: Chronic | ICD-10-CM

## 2022-10-25 DIAGNOSIS — G89.29 CHRONIC MIDLINE LOW BACK PAIN, UNSPECIFIED WHETHER SCIATICA PRESENT: Chronic | ICD-10-CM

## 2022-10-25 PROBLEM — M85.80 OSTEOPENIA: Status: ACTIVE | Noted: 2021-06-29

## 2022-10-25 LAB
ALBUMIN SERPL BCP-MCNC: 3.9 G/DL (ref 3.5–5)
ALBUMIN/GLOB SERPL: 1.1 {RATIO}
ALP SERPL-CCNC: 78 U/L (ref 55–142)
ALT SERPL W P-5'-P-CCNC: 20 U/L (ref 13–56)
ANION GAP SERPL CALCULATED.3IONS-SCNC: 11 MMOL/L (ref 7–16)
AST SERPL W P-5'-P-CCNC: 14 U/L (ref 15–37)
BILIRUB SERPL-MCNC: 0.4 MG/DL (ref ?–1.2)
BUN SERPL-MCNC: 27 MG/DL (ref 7–18)
BUN/CREAT SERPL: 29 (ref 6–20)
CALCIUM SERPL-MCNC: 9.6 MG/DL (ref 8.5–10.1)
CHLORIDE SERPL-SCNC: 107 MMOL/L (ref 98–107)
CHOLEST SERPL-MCNC: 186 MG/DL (ref 0–200)
CHOLEST/HDLC SERPL: 3.3 {RATIO}
CO2 SERPL-SCNC: 28 MMOL/L (ref 21–32)
CREAT SERPL-MCNC: 0.92 MG/DL (ref 0.55–1.02)
EGFR (NO RACE VARIABLE) (RUSH/TITUS): 64 ML/MIN/1.73M²
GLOBULIN SER-MCNC: 3.5 G/DL (ref 2–4)
GLUCOSE SERPL-MCNC: 114 MG/DL (ref 74–106)
HDLC SERPL-MCNC: 57 MG/DL (ref 40–60)
LDLC SERPL CALC-MCNC: 105 MG/DL
LDLC/HDLC SERPL: 1.8 {RATIO}
NONHDLC SERPL-MCNC: 129 MG/DL
POTASSIUM SERPL-SCNC: 4.6 MMOL/L (ref 3.5–5.1)
PROT SERPL-MCNC: 7.4 G/DL (ref 6.4–8.2)
SODIUM SERPL-SCNC: 141 MMOL/L (ref 136–145)
TRIGL SERPL-MCNC: 120 MG/DL (ref 35–150)
VLDLC SERPL-MCNC: 24 MG/DL

## 2022-10-25 PROCEDURE — 80061 LIPID PANEL: ICD-10-PCS | Mod: ,,, | Performed by: CLINICAL MEDICAL LABORATORY

## 2022-10-25 PROCEDURE — 99214 OFFICE O/P EST MOD 30 MIN: CPT | Mod: ,,, | Performed by: NURSE PRACTITIONER

## 2022-10-25 PROCEDURE — 99214 PR OFFICE/OUTPT VISIT, EST, LEVL IV, 30-39 MIN: ICD-10-PCS | Mod: ,,, | Performed by: NURSE PRACTITIONER

## 2022-10-25 PROCEDURE — 80061 LIPID PANEL: CPT | Mod: ,,, | Performed by: CLINICAL MEDICAL LABORATORY

## 2022-10-25 PROCEDURE — 80053 COMPREHENSIVE METABOLIC PANEL: ICD-10-PCS | Mod: ,,, | Performed by: CLINICAL MEDICAL LABORATORY

## 2022-10-25 PROCEDURE — 80053 COMPREHEN METABOLIC PANEL: CPT | Mod: ,,, | Performed by: CLINICAL MEDICAL LABORATORY

## 2022-10-25 RX ORDER — PRAVASTATIN SODIUM 40 MG/1
40 TABLET ORAL DAILY
Qty: 90 TABLET | Refills: 3 | Status: SHIPPED | OUTPATIENT
Start: 2022-10-25 | End: 2023-07-11 | Stop reason: SDUPTHER

## 2022-10-25 RX ORDER — PROMETHAZINE HYDROCHLORIDE 25 MG/1
25 TABLET ORAL EVERY 6 HOURS PRN
Qty: 30 TABLET | Refills: 2 | Status: SHIPPED | OUTPATIENT
Start: 2022-10-25 | End: 2023-07-11 | Stop reason: SDUPTHER

## 2022-10-25 RX ORDER — BUPROPION HYDROCHLORIDE 150 MG/1
150 TABLET ORAL DAILY
Qty: 90 TABLET | Refills: 1 | Status: SHIPPED | OUTPATIENT
Start: 2022-10-25 | End: 2023-07-11

## 2022-10-25 RX ORDER — UMECLIDINIUM BROMIDE AND VILANTEROL TRIFENATATE 62.5; 25 UG/1; UG/1
1 POWDER RESPIRATORY (INHALATION) DAILY
Qty: 90 EACH | Refills: 3 | Status: SHIPPED | OUTPATIENT
Start: 2022-10-25 | End: 2023-07-11 | Stop reason: SDUPTHER

## 2022-10-25 RX ORDER — LISINOPRIL 20 MG/1
20 TABLET ORAL 2 TIMES DAILY
Qty: 180 TABLET | Refills: 3 | Status: SHIPPED | OUTPATIENT
Start: 2022-10-25 | End: 2023-07-11 | Stop reason: SDUPTHER

## 2022-10-25 RX ORDER — ALBUTEROL SULFATE 90 UG/1
2 AEROSOL, METERED RESPIRATORY (INHALATION) EVERY 4 HOURS PRN
Qty: 18 G | Refills: 1 | Status: SHIPPED | OUTPATIENT
Start: 2022-10-25 | End: 2023-07-11 | Stop reason: SDUPTHER

## 2022-10-25 RX ORDER — GABAPENTIN 300 MG/1
300 CAPSULE ORAL 3 TIMES DAILY
Qty: 270 CAPSULE | Refills: 1 | Status: SHIPPED | OUTPATIENT
Start: 2022-10-25 | End: 2023-07-11 | Stop reason: SDUPTHER

## 2022-10-25 RX ORDER — HYDROCODONE BITARTRATE AND ACETAMINOPHEN 7.5; 325 MG/1; MG/1
1 TABLET ORAL 2 TIMES DAILY PRN
COMMUNITY
Start: 2022-10-14 | End: 2023-07-11

## 2022-10-25 NOTE — PROGRESS NOTES
"[facility] - FAMILY MEDICINE       PATIENT NAME: Tawny Rutledge   : 1945    AGE: 77 y.o. DATE OF ENCOUNTER: 10/25/22    MRN: [id number]      PCP: MATT Otto    Reason for Visit / Chief Complaint:  Hypertension, Hyperlipidemia, and Follow-up (6mo fu/)     Subjective:     HPI:    Presents for f/u HTN & HLD.  Doesn't keep f/u as advised.  Never comes fasting to appts, "can't make it to am appt".  Pravastatin doubled after last visit Dec 2021 - reports she couldn't take a whole tab bc it made her feel funny so has only been taking 1/2 tablet.  Smoker, no plans to quit, "too nervous"    Saw Dr. Ward for chronic LBP & DDD; he started gabapentin & asks if I can refill it d/t no reason to go back to him if not having surgery.    Now seeing Evon Sprague NP at [facility].    3 mths ago had CP radiating to both arms, has happened a couple other times and has noticed it occurs when she is having a panic attacks;  has been under a lot of stress w/ adult disabled son & finances; wants to try wellbutrin bc it helped in the past    Review of Systems:     Review of Systems   Constitutional:  Positive for appetite change (poor appetite) and fatigue.   Respiratory:  Positive for cough, shortness of breath and wheezing.         COPD   Cardiovascular: Negative.    Gastrointestinal:  Positive for nausea. Negative for abdominal pain and vomiting. Diarrhea: better; has UC.  Genitourinary: Negative.    Musculoskeletal:  Positive for back pain.   Skin: Negative.    Neurological:  Positive for headaches.   Psychiatric/Behavioral:  The patient is nervous/anxious.      Allergies:     Review of patient's allergies indicates:   Allergen Reactions    Opioids - morphine analogues Nausea And Vomiting        Labs:     Lipids:   Lab Results   Component Value Date    CHOL 207 (H) 2021     Lab Results   Component Value Date    HDL 54 2021     Lab Results   Component Value Date    LDLCALC 126 " "12/20/2021     Lab Results   Component Value Date    TRIG 136 12/20/2021     CMP:  Sodium   Date Value Ref Range Status   12/20/2021 143 136 - 145 mmol/L Final     Potassium   Date Value Ref Range Status   12/20/2021 4.1 3.5 - 5.1 mmol/L Final     Chloride   Date Value Ref Range Status   12/20/2021 108 (H) 98 - 107 mmol/L Final     Glucose   Date Value Ref Range Status   12/20/2021 90 74 - 106 mg/dL Final     BUN   Date Value Ref Range Status   12/20/2021 22 (H) 7 - 18 mg/dL Final     Creatinine   Date Value Ref Range Status   12/20/2021 0.68 0.55 - 1.02 mg/dL Final     AST   Date Value Ref Range Status   12/20/2021 16 15 - 37 U/L Final     ALT   Date Value Ref Range Status   12/20/2021 19 13 - 56 U/L Final     eGFR   Date Value Ref Range Status   12/20/2021 89 >=60 mL/min/1.73m² Final      CBC:  Lab Results   Component Value Date    WBC 8.74 12/20/2021    RBC 4.49 12/20/2021    HGB 14.1 12/20/2021    HCT 43.4 12/20/2021     12/20/2021      TSH:  Lab Results   Component Value Date    TSH 1.100 12/20/2021      Objective:      Vitals:    10/25/22 1335   BP: 120/62   BP Location: Left arm   Patient Position: Sitting   BP Method: Medium (Manual)   Pulse: 78   Resp: 18   Temp: 97.4 °F (36.3 °C)   SpO2: 98%   Weight: 46.4 kg (102 lb 3.2 oz)   Height: 5' 1.5" (1.562 m)     Body mass index is 19 kg/m².     Physical Exam:    Physical Exam  Vitals and nursing note reviewed.   Constitutional:       General: She is not in acute distress.  HENT:      Head: Normocephalic.      Right Ear: Tympanic membrane, ear canal and external ear normal.      Left Ear: Tympanic membrane, ear canal and external ear normal.      Nose: Nose normal.      Mouth/Throat:      Mouth: Mucous membranes are moist.      Pharynx: Oropharynx is clear.   Eyes:      Conjunctiva/sclera: Conjunctivae normal.      Pupils: Pupils are equal, round, and reactive to light.   Neck:      Thyroid: No thyroid mass or thyromegaly.      Vascular: Normal carotid " pulses. No carotid bruit.   Cardiovascular:      Rate and Rhythm: Normal rate and regular rhythm.      Pulses: Normal pulses.      Heart sounds: Normal heart sounds.   Pulmonary:      Effort: Pulmonary effort is normal.      Breath sounds: Normal breath sounds.   Abdominal:      Palpations: Abdomen is soft.      Tenderness: There is no abdominal tenderness.   Musculoskeletal:      Cervical back: Neck supple.      Right lower leg: No edema.      Left lower leg: No edema.   Lymphadenopathy:      Cervical: No cervical adenopathy.   Skin:     General: Skin is warm and dry.   Neurological:      General: No focal deficit present.      Mental Status: She is alert and oriented to person, place, and time.   Psychiatric:         Mood and Affect: Mood normal.         Behavior: Behavior normal.        Assessment:          ICD-10-CM ICD-9-CM   1. Benign essential hypertension  I10 401.1   2. COPD with chronic bronchitis and emphysema  J44.9 491.20   3. Anxiety  F41.9 300.00   4. Ulcerative pancolitis with complication  K51.019 556.6   5. Nicotine dependence, cigarettes, uncomplicated  F17.210 305.1   6. Chronic midline low back pain, unspecified whether sciatica present  M54.50 724.2    G89.29 338.29   7. Hypercholesteremia  E78.00 272.0   8. Lumbar disc disease  M51.9 722.93   9. Encounter for long-term (current) use of other medications  Z79.899 V58.69        Plan:       Benign essential hypertension  -     lisinopriL (PRINIVIL,ZESTRIL) 20 MG tablet; Take 1 tablet (20 mg total) by mouth 2 (two) times daily.  Dispense: 180 tablet; Refill: 3  -     Comprehensive Metabolic Panel; Future; Expected date: 10/25/2022    COPD with chronic bronchitis and emphysema  -     ANORO ELLIPTA 62.5-25 mcg/actuation DsDv; Inhale 1 puff into the lungs once daily.  Dispense: 90 each; Refill: 3  -     albuterol (PROVENTIL/VENTOLIN HFA) 90 mcg/actuation inhaler; Inhale 2 puffs into the lungs every 4 (four) hours as needed for Wheezing or Shortness  of Breath. Rescue  Dispense: 18 g; Refill: 1    Anxiety  -     buPROPion (WELLBUTRIN XL) 150 MG TB24 tablet; Take 1 tablet (150 mg total) by mouth once daily.  Dispense: 90 tablet; Refill: 1    Ulcerative pancolitis with complication    Nicotine dependence, cigarettes, uncomplicated    Chronic midline low back pain, unspecified whether sciatica present  -     gabapentin (NEURONTIN) 300 MG capsule; Take 1 capsule (300 mg total) by mouth 3 (three) times daily.  Dispense: 270 capsule; Refill: 1    Hypercholesteremia  -     pravastatin (PRAVACHOL) 40 MG tablet; Take 1 tablet (40 mg total) by mouth once daily.  Dispense: 90 tablet; Refill: 3  -     Comprehensive Metabolic Panel; Future; Expected date: 10/25/2022  -     Lipid Panel; Future; Expected date: 10/25/2022    Lumbar disc disease  -     gabapentin (NEURONTIN) 300 MG capsule; Take 1 capsule (300 mg total) by mouth 3 (three) times daily.  Dispense: 270 capsule; Refill: 1    Encounter for long-term (current) use of other medications  -     Comprehensive Metabolic Panel; Future; Expected date: 10/25/2022    Other orders  -     promethazine (PHENERGAN) 25 MG tablet; Take 1 tablet (25 mg total) by mouth every 6 (six) hours as needed for Nausea.  Dispense: 30 tablet; Refill: 2      Current Outpatient Medications:     albuterol (PROVENTIL/VENTOLIN HFA) 90 mcg/actuation inhaler, Inhale 2 puffs into the lungs every 4 (four) hours as needed for Wheezing or Shortness of Breath. Rescue, Disp: 18 g, Rfl: 1    ANORO ELLIPTA 62.5-25 mcg/actuation DsDv, Inhale 1 puff into the lungs once daily., Disp: 90 each, Rfl: 3    buPROPion (WELLBUTRIN XL) 150 MG TB24 tablet, Take 1 tablet (150 mg total) by mouth once daily., Disp: 90 tablet, Rfl: 1    gabapentin (NEURONTIN) 300 MG capsule, Take 1 capsule (300 mg total) by mouth 3 (three) times daily., Disp: 270 capsule, Rfl: 1    HYDROcodone-acetaminophen (NORCO) 7.5-325 mg per tablet, Take 1 tablet by mouth 2 (two) times daily as needed.,  Disp: , Rfl:     lisinopriL (PRINIVIL,ZESTRIL) 20 MG tablet, Take 1 tablet (20 mg total) by mouth 2 (two) times daily., Disp: 180 tablet, Rfl: 3    pravastatin (PRAVACHOL) 40 MG tablet, Take 1 tablet (40 mg total) by mouth once daily., Disp: 90 tablet, Rfl: 3    promethazine (PHENERGAN) 25 MG tablet, Take 1 tablet (25 mg total) by mouth every 6 (six) hours as needed for Nausea., Disp: 30 tablet, Rfl: 2    New & refilled meds:  Requested Prescriptions     Signed Prescriptions Disp Refills    promethazine (PHENERGAN) 25 MG tablet 30 tablet 2     Sig: Take 1 tablet (25 mg total) by mouth every 6 (six) hours as needed for Nausea.    lisinopriL (PRINIVIL,ZESTRIL) 20 MG tablet 180 tablet 3     Sig: Take 1 tablet (20 mg total) by mouth 2 (two) times daily.    gabapentin (NEURONTIN) 300 MG capsule 270 capsule 1     Sig: Take 1 capsule (300 mg total) by mouth 3 (three) times daily.    pravastatin (PRAVACHOL) 40 MG tablet 90 tablet 3     Sig: Take 1 tablet (40 mg total) by mouth once daily.    ANORO ELLIPTA 62.5-25 mcg/actuation DsDv 90 each 3     Sig: Inhale 1 puff into the lungs once daily.    albuterol (PROVENTIL/VENTOLIN HFA) 90 mcg/actuation inhaler 18 g 1     Sig: Inhale 2 puffs into the lungs every 4 (four) hours as needed for Wheezing or Shortness of Breath. Rescue    buPROPion (WELLBUTRIN XL) 150 MG TB24 tablet 90 tablet 1     Sig: Take 1 tablet (150 mg total) by mouth once daily.     Declines flu shot or prevnar-20.  Declines covid vaccine.    Previous labs reviewed & f/u labs ordered for today.  Med refills.  Start wellbutrin for anxiety.  Advised to quit smoking.    Refuses pap, mammo, or DEXA.  No eye exam in years and reports plans to update soon.  Wants to self-schedule.    Advised to go to ER for further episodes of CP.  VU.    Return to clinic 6 mths for HTN, LBP, anxiety; and f/u as needed.    Future Appointments   Date Time Provider Department Center   11/16/2022 11:00 AM AWV NURSE, [facility] FAMILY  MEDICINE [facility] Gutierrez   4/26/2023  1:20 PM MATT Otto [facility] [place]        Signature:  MATT Otto

## 2022-11-02 NOTE — PROGRESS NOTES
I sent rx for phenergan 10/25/22 - has she checked w/ pharmacy.  If they didn't get it, please call it in. Thx

## 2023-03-24 LAB — CRC RECOMMENDATION EXT: NORMAL

## 2023-03-28 ENCOUNTER — PATIENT OUTREACH (OUTPATIENT)
Dept: ADMINISTRATIVE | Facility: HOSPITAL | Age: 78
End: 2023-03-28

## 2023-03-28 NOTE — PROGRESS NOTES
SILKE updated with colonoscopy reports from Dr. Pendleton on 3/24/2023. Medical history updated.

## 2023-05-09 DIAGNOSIS — Z71.89 COMPLEX CARE COORDINATION: ICD-10-CM

## 2023-07-11 ENCOUNTER — OFFICE VISIT (OUTPATIENT)
Dept: FAMILY MEDICINE | Facility: CLINIC | Age: 78
End: 2023-07-11
Payer: MEDICARE

## 2023-07-11 VITALS
TEMPERATURE: 98 F | WEIGHT: 104 LBS | DIASTOLIC BLOOD PRESSURE: 73 MMHG | HEIGHT: 62 IN | OXYGEN SATURATION: 95 % | SYSTOLIC BLOOD PRESSURE: 127 MMHG | RESPIRATION RATE: 20 BRPM | BODY MASS INDEX: 19.14 KG/M2 | HEART RATE: 95 BPM

## 2023-07-11 DIAGNOSIS — M51.9 LUMBAR DISC DISEASE: Chronic | ICD-10-CM

## 2023-07-11 DIAGNOSIS — K51.019 ULCERATIVE PANCOLITIS WITH COMPLICATION: Chronic | ICD-10-CM

## 2023-07-11 DIAGNOSIS — E78.00 HYPERCHOLESTEREMIA: Chronic | ICD-10-CM

## 2023-07-11 DIAGNOSIS — F41.9 ANXIETY: Chronic | ICD-10-CM

## 2023-07-11 DIAGNOSIS — J44.89 COPD WITH CHRONIC BRONCHITIS AND EMPHYSEMA: Chronic | ICD-10-CM

## 2023-07-11 DIAGNOSIS — G89.29 CHRONIC MIDLINE LOW BACK PAIN, UNSPECIFIED WHETHER SCIATICA PRESENT: Chronic | ICD-10-CM

## 2023-07-11 DIAGNOSIS — F17.210 NICOTINE DEPENDENCE, CIGARETTES, UNCOMPLICATED: Chronic | ICD-10-CM

## 2023-07-11 DIAGNOSIS — I10 BENIGN ESSENTIAL HYPERTENSION: Primary | Chronic | ICD-10-CM

## 2023-07-11 DIAGNOSIS — M54.50 CHRONIC MIDLINE LOW BACK PAIN, UNSPECIFIED WHETHER SCIATICA PRESENT: Chronic | ICD-10-CM

## 2023-07-11 DIAGNOSIS — J43.9 COPD WITH CHRONIC BRONCHITIS AND EMPHYSEMA: Chronic | ICD-10-CM

## 2023-07-11 PROCEDURE — 99214 OFFICE O/P EST MOD 30 MIN: CPT | Mod: ,,, | Performed by: NURSE PRACTITIONER

## 2023-07-11 PROCEDURE — 99214 PR OFFICE/OUTPT VISIT, EST, LEVL IV, 30-39 MIN: ICD-10-PCS | Mod: ,,, | Performed by: NURSE PRACTITIONER

## 2023-07-11 RX ORDER — PREDNISONE 10 MG/1
10 TABLET ORAL DAILY
COMMUNITY
Start: 2023-03-13

## 2023-07-11 RX ORDER — UMECLIDINIUM BROMIDE AND VILANTEROL TRIFENATATE 62.5; 25 UG/1; UG/1
1 POWDER RESPIRATORY (INHALATION) DAILY
Qty: 90 EACH | Refills: 3 | Status: SHIPPED | OUTPATIENT
Start: 2023-07-11 | End: 2024-07-10

## 2023-07-11 RX ORDER — PROMETHAZINE HYDROCHLORIDE 25 MG/1
25 TABLET ORAL EVERY 6 HOURS PRN
Qty: 30 TABLET | Refills: 2 | Status: SHIPPED | OUTPATIENT
Start: 2023-07-11

## 2023-07-11 RX ORDER — GABAPENTIN 300 MG/1
300 CAPSULE ORAL 3 TIMES DAILY
Qty: 270 CAPSULE | Refills: 1 | Status: SHIPPED | OUTPATIENT
Start: 2023-07-11 | End: 2024-01-07

## 2023-07-11 RX ORDER — PRAVASTATIN SODIUM 40 MG/1
40 TABLET ORAL DAILY
Qty: 90 TABLET | Refills: 3 | Status: SHIPPED | OUTPATIENT
Start: 2023-07-11 | End: 2024-07-10

## 2023-07-11 RX ORDER — ALBUTEROL SULFATE 90 UG/1
2 AEROSOL, METERED RESPIRATORY (INHALATION) EVERY 4 HOURS PRN
Qty: 18 G | Refills: 1 | Status: SHIPPED | OUTPATIENT
Start: 2023-07-11

## 2023-07-11 RX ORDER — BUSPIRONE HYDROCHLORIDE 10 MG/1
10 TABLET ORAL 3 TIMES DAILY
Qty: 90 TABLET | Refills: 2 | Status: SHIPPED | OUTPATIENT
Start: 2023-07-11

## 2023-07-11 RX ORDER — LISINOPRIL 20 MG/1
20 TABLET ORAL 2 TIMES DAILY
Qty: 180 TABLET | Refills: 1 | Status: SHIPPED | OUTPATIENT
Start: 2023-07-11 | End: 2024-02-12 | Stop reason: SDUPTHER

## 2023-07-11 NOTE — PATIENT INSTRUCTIONS
"Patient Education       Quitting Smoking   The Basics   Written by the doctors and editors at [facility]   What are the benefits of quitting smoking? -- Quitting smoking can dramatically improve your health and help you live longer. It lowers your risk of heart disease, lung disease, kidney failure, infection, and cancer.   Quitting smoking can also lower your chances of getting osteoporosis, a condition that makes your bones weak. Plus, it can help your skin look younger and reduce the chances that you will have problems with sex.  Quitting is not easy for most people, and it can take several tries to completely quit. But help and support are available. Quitting smoking will improve your health no matter how old you are, even if you have smoked for a long time.   What should I do if I want to quit smoking? -- It's a good idea to start by talking with your doctor or nurse. It is possible to quit on your own, without help. But getting help greatly increases your chances of quitting successfully.  When you are ready to quit, you will make a plan to:  Set a quit date  Tell your family and friends that you plan to quit  Plan ahead for the challenges you will face, such as cigarette cravings  Remove cigarettes from your home, car, and work  How can my doctor or nurse help? -- Your doctor or nurse can give you advice on the best way to quit. They can also give you medicines to:  Reduce your craving for cigarettes  Reduce your "withdrawal" symptoms (symptoms that happen when you stop smoking)  Your doctor or nurse can also help you find a counselor to talk to. For most people who are trying to quit smoking, it works best to use both medicines and counseling.  You can also get help from a free phone line (2-898-QUIT-NOW or 1-105.184.4204) or go online to www.smokefree.gov.  What are the symptoms of withdrawal? -- When you stop smoking, you might have symptoms such as:  Trouble sleeping  Feeling irritable, anxious, or " restless  Getting frustrated or angry  Having trouble thinking clearly  These symptoms can be hard to deal with, which is why it can be so hard to quit. But medicines can help.  Some people who stop smoking become temporarily depressed. Some people need treatment for depression, such as counseling or medicines or both. People with depression might:  No longer enjoy or care about doing the things they used to like to do  Feel sad, down, hopeless, nervous, or cranky most of the day, almost every day  Lose or gain weight  Sleep too much or too little  Feel tired or like they have no energy  Feel guilty or like they are worth nothing  Forget things or feel confused  Move and speak more slowly than usual  Act restless or have trouble staying still  Think about death or suicide  If you think you might be depressed, tell your doctor or nurse right away. They can talk to you about your symptoms and recommend treatment if needed.  If you ever feel like you might hurt yourself, go straight to the nearest emergency department or call for an ambulance (in the [place] and [place], dial 9-1-1). You can also call your doctor or nurse and tell them it is an emergency, or reach the  National Suicide Prevention Lifeline at 1-351.180.5700 or www.suicidepreventionlifeline.org.  How does counseling work? -- A counselor can help you figure out:  What triggers you to want to smoke, and how to handle these situations  How to resist cravings  What you can do differently if you have tried to quit before  You can meet with a counselor in one-on-one sessions or as part of a group. There are other ways to get counseling, too, such as over the phone, through text messaging, or online.   How do medicines help you stop smoking? -- Different medicines work in different ways:  Nicotine replacement therapy - Nicotine is the main drug in cigarettes, and the reason they are addictive. These medicines reduce your body's craving for nicotine. They also help  "with withdrawal symptoms.  There are different forms of nicotine replacement, including skin patches, lozenges, gum, nasal sprays, and inhalers. Most can be bought without a prescription. Also, health insurance might cover some or all of the cost.  It often helps to use 2 forms of nicotine replacement. For example, you might wear a patch all the time, plus use gum or lozenges when you get a craving to smoke.  Varenicline - Varenicline (brand names: Chantix, Champix) is a prescription medicine that reduces withdrawal symptoms and cigarette cravings. Varenicline can increase the effects of alcohol in some people. It's a good idea to limit drinking while you're taking it, at least until you know how it affects you.  Even if you are not yet ready to commit to a quit date, varenicline can help reduce cravings. This can make it easier to quit when you are ready.  Bupropion - Bupropion (sample brand names: Wellbutrin, Zyban) is a prescription medicine that reduces your desire to smoke. It is also available in a generic version, which is cheaper than the brand name medicines. Doctors do not usually prescribe bupropion for people with seizures or who have had seizures in the past.  It might also be helpful to combine nicotine replacement with bupropion or varenicline. In some cases, a person might even take both bupropion and varenicline. Your doctor or nurse can help you figure out the best combination for you.  What about e-cigarettes? -- Sometimes people wonder if using electronic cigarettes, or "e-cigarettes," can help them quit smoking. Using e-cigarettes is also called "vaping." Doctors do not recommend e-cigarettes in place of medicines and counseling. That's because e-cigarettes still contain nicotine as well as other substances that might be harmful. It's also not clear how they can affect a person's health in the long term.  What if I am pregnant and I smoke? -- If you are pregnant, it's really important for the " health of your baby that you quit. Ask your doctor what options you have, and what is safest for your baby.  Will I gain weight if I quit? -- You might gain a few pounds. This can be frustrating for some people, but it's important to remember that you are improving your health by quitting smoking. You can help prevent gaining a lot of weight by staying active and eating a healthy diet.  What if I am not able to quit? -- If you don't quit on your first try, or if you quit but then start smoking again, don't give up hope. Lots of people have to try more than once before they are able to completely quit.  It might help to try to understand why quitting did not work. There might be something you can do differently when you try again. It can help to figure out what situations make you want to smoke, so you can avoid them.   What else can I do to improve my chances of quitting? -- You can:  Get regular exercise. Any type of physical activity, even gentle forms of movement, is good for your health. Physical activity can also help reduce stress.  Stay away from smokers and places that make you want to smoke. If people close to you smoke, ask them to quit with you or avoid smoking around you.  Carry gum, hard candy, or something to put in your mouth. If you get a craving for a cigarette, try one of these instead.  Don't give up, even if you start smoking again. It takes most people a few tries before they succeed.  All topics are updated as new evidence becomes available and our peer review process is complete.  This topic retrieved from [employer] on: Sep 21, 2021.  Topic 34741 Version 22.0  Release: 29.4.2 - C29.263  © 2021 UpToDate, Inc. and/or its affiliates. All rights reserved.  Consumer Information Use and Disclaimer   This information is not specific medical advice and does not replace information you receive from your health care provider. This is only a brief summary of general information. It does NOT include all  information about conditions, illnesses, injuries, tests, procedures, treatments, therapies, discharge instructions or life-style choices that may apply to you. You must talk with your health care provider for complete information about your health and treatment options. This information should not be used to decide whether or not to accept your health care provider's advice, instructions or recommendations. Only your health care provider has the knowledge and training to provide advice that is right for you. The use of this information is governed by the [employer] End User License Agreement, available at https://www.Dragonplay/en/solutions/igobubble/about/afia.The use of [employer] content is governed by the [employer] Terms of Use. ©2021 UpToDate, Inc. All rights reserved.  Copyright   © 2021 UpToDate, Inc. and/or its affiliates. All rights reserved.  Patient Education       Preventing Falls   The Basics   Written by the doctors and editors at [facility]   Am I at risk of falling? -- Your risk of falling increases as you grow older. That's because getting older can make it harder to walk steadily and keep your balance. Also, the effects of falls are more serious in older people.  Overall, 3 to 4 out of every 10 people over the age of 65 fall each year. Up to 75 percent of people who fracture a hip never recover to the point they were before they had their fracture. If you have fallen in the past, you are at higher risk of falling again.  Several things can increase your risk of a fall, including:  Illness  A change in the medicines you take  An unsafe or unfamiliar setting (for example, a room with rugs or furniture that might trip you, or an area you don't know well)  How can my doctor help me to avoid falling? -- Your doctor can talk to you about the following things:  Past falls - It is important to tell your doctor about any times you have fallen or almost fallen. He or she can then suggest ways to prevent  another fall.  Your health conditions - Some health problems can put you at risk of falling. These include conditions that affect eyesight, hearing, muscle strength, or balance.  The medicines you take - Certain medicines can increase the risk of falling. These include some medicines that are used for sleeping problems, anxiety, high blood pressure, or depression. Adding new medicines, or changing doses of some medicines, can also affect your risk of falling.  The more your doctor knows about your situation, the better he or she will be able to help you. For example, if you fell because you have a condition that causes pain, your doctor might suggest treatments to deal with the pain. Or if one of your medicines is making you dizzy and more likely to fall, your doctor might switch you to a different medicine.  Is there anything I can do on my own? -- Yes. To help keep from falling, you can:  Make your home safer - To avoid falling at home, get rid of things that might make you trip or slip. This might include furniture, electrical cords, clutter, and loose rugs (figure 1). Keep your home well-lit so that you can easily see where you are going. Avoid storing things in high places so you don't have to reach or climb.  Wear sturdy shoes that fit well - Wearing shoes with high heels or slippery soles, or shoes that are too loose, can lead to falls. Walking around in bare feet, or only socks, can also increase your risk of falling.  Take vitamin D pills - Taking vitamin D might lower the risk of falls in older people. This is because vitamin D helps make bones and muscles stronger. Your doctor can talk to you about whether you should take extra vitamin D, and how much.  Stay active - Exercising on a regular basis can help lower your risk of falling. It might also help prevent you from getting hurt if you do fall. It is best to do a few different activities that help with both strength and balance. There are many kinds of  exercise that can be safe for older people. These include walking, swimming, and [employer] Chi (a Chinese martial art that involves slow, gentle movements).  Use a cane, walker, and other safety devices - If your doctor recommends that you use a cane or walker, be sure that it's the right size and you know how to use it. There are other devices that might help you avoid falling, too. These include grab bars or a sturdy seat for the shower, non-slip bath mats, and hand rails or treads for the stairs (to prevent slipping).  If you worry that you could fall, there are also alarm buttons that let you call for help if you fall and can't get up.  What should I do if I fall? -- If you fall, see your doctor right away, even if you aren't hurt. Your doctor can try to figure out what caused you to fall, and how likely you are to fall again. He or she will do an exam and talk to you about your health problems, medicines, and activities. Then he or she can suggest things you can do to avoid falling again.  Many older people have a hard time recovering after a fall. Doing things to prevent falling can help you to protect your health and independence.  All topics are updated as new evidence becomes available and our peer review process is complete.  This topic retrieved from [employer] on: Sep 21, 2021.  Topic 81539 Version 18.0  Release: 29.4.2 - C29.263  © 2021 UpToDate, Inc. and/or its affiliates. All rights reserved.  figure 1: How to avoid falling at home     This picture shows some of the things that can cause a fall in your home. Look around and remove any loose rugs, electrical cords, clutter, or furniture that could trip you.  Graphic 01888 Version 1.0    Consumer Information Use and Disclaimer   This information is not specific medical advice and does not replace information you receive from your health care provider. This is only a brief summary of general information. It does NOT include all information about conditions,  illnesses, injuries, tests, procedures, treatments, therapies, discharge instructions or life-style choices that may apply to you. You must talk with your health care provider for complete information about your health and treatment options. This information should not be used to decide whether or not to accept your health care provider's advice, instructions or recommendations. Only your health care provider has the knowledge and training to provide advice that is right for you. The use of this information is governed by the [employer] End User License Agreement, available at https://www.inthinc.Channelinsight/en/solutions/[employer]ings/about/afia.The use of [employer] content is governed by the [employer] Terms of Use. ©2021 [employer] All rights reserved.  Copyright   © 2021 UpToDate, Inc. and/or its affiliates. All rights reserved.
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
## 2023-07-25 ENCOUNTER — TELEPHONE (OUTPATIENT)
Dept: FAMILY MEDICINE | Facility: CLINIC | Age: 78
End: 2023-07-25
Payer: MEDICARE

## 2023-07-25 RX ORDER — CYCLOBENZAPRINE HCL 5 MG
5 TABLET ORAL 3 TIMES DAILY PRN
Qty: 60 TABLET | Refills: 1 | Status: SHIPPED | OUTPATIENT
Start: 2023-07-25

## 2023-07-25 NOTE — TELEPHONE ENCOUNTER
----- Message from Gwen Luque sent at 7/25/2023  2:23 PM CDT -----  Pt need Flexeril called in to [facility]

## 2023-12-09 DIAGNOSIS — Z71.89 COMPLEX CARE COORDINATION: ICD-10-CM

## 2024-02-12 ENCOUNTER — TELEPHONE (OUTPATIENT)
Dept: FAMILY MEDICINE | Facility: CLINIC | Age: 79
End: 2024-02-12
Payer: MEDICARE

## 2024-02-12 DIAGNOSIS — I10 BENIGN ESSENTIAL HYPERTENSION: Chronic | ICD-10-CM

## 2024-02-12 RX ORDER — LISINOPRIL 20 MG/1
20 TABLET ORAL 2 TIMES DAILY
Qty: 180 TABLET | Refills: 0 | Status: SHIPPED | OUTPATIENT
Start: 2024-02-12
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
## 2024-07-09 DIAGNOSIS — Z71.89 COMPLEX CARE COORDINATION: ICD-10-CM

## 2024-07-22 ENCOUNTER — TELEPHONE (OUTPATIENT)
Dept: FAMILY MEDICINE | Facility: CLINIC | Age: 79
End: 2024-07-22
Payer: MEDICARE

## 2024-07-22 DIAGNOSIS — G89.29 CHRONIC MIDLINE LOW BACK PAIN, UNSPECIFIED WHETHER SCIATICA PRESENT: Chronic | ICD-10-CM

## 2024-07-22 DIAGNOSIS — M51.9 LUMBAR DISC DISEASE: Chronic | ICD-10-CM

## 2024-07-22 DIAGNOSIS — M54.50 CHRONIC MIDLINE LOW BACK PAIN, UNSPECIFIED WHETHER SCIATICA PRESENT: Chronic | ICD-10-CM

## 2024-07-22 RX ORDER — GABAPENTIN 300 MG/1
300 CAPSULE ORAL 3 TIMES DAILY
Qty: 270 CAPSULE | Refills: 1 | Status: CANCELLED | OUTPATIENT
Start: 2024-07-22 | End: 2025-01-18

## 2024-07-22 NOTE — TELEPHONE ENCOUNTER
Refill request declined.  She will have to have an appointment for refills of gabapentin.  I have not seen her in over a year.

## 2024-07-22 NOTE — TELEPHONE ENCOUNTER
----- Message from Stefany Zaidi sent at 7/22/2024 11:39 AM CDT -----  Regarding: REFILLS  Good morning,   I had to call this patient to reschedule her Medicare AWV that was scheduled for 07/25/24. The patient asked if her Lisinopril & Neurontin could be sent to [facility] until she is able to come in for an appt. Patient can be reached at [phone].

## 2024-08-21 ENCOUNTER — APPOINTMENT (OUTPATIENT)
Dept: RADIOLOGY | Facility: CLINIC | Age: 79
End: 2024-08-21
Attending: NURSE PRACTITIONER
Payer: MEDICARE

## 2024-08-21 ENCOUNTER — OFFICE VISIT (OUTPATIENT)
Dept: FAMILY MEDICINE | Facility: CLINIC | Age: 79
End: 2024-08-21
Payer: MEDICARE

## 2024-08-21 VITALS
HEART RATE: 77 BPM | WEIGHT: 96.81 LBS | TEMPERATURE: 99 F | RESPIRATION RATE: 18 BRPM | OXYGEN SATURATION: 97 % | DIASTOLIC BLOOD PRESSURE: 80 MMHG | BODY MASS INDEX: 17.81 KG/M2 | HEIGHT: 62 IN | SYSTOLIC BLOOD PRESSURE: 138 MMHG

## 2024-08-21 DIAGNOSIS — I10 BENIGN ESSENTIAL HYPERTENSION: ICD-10-CM

## 2024-08-21 DIAGNOSIS — Z79.899 ENCOUNTER FOR LONG-TERM (CURRENT) USE OF OTHER MEDICATIONS: ICD-10-CM

## 2024-08-21 DIAGNOSIS — F32.1 MAJOR DEPRESSIVE DISORDER, SINGLE EPISODE, MODERATE: ICD-10-CM

## 2024-08-21 DIAGNOSIS — J43.9 COPD WITH CHRONIC BRONCHITIS AND EMPHYSEMA: Chronic | ICD-10-CM

## 2024-08-21 DIAGNOSIS — J44.89 COPD WITH CHRONIC BRONCHITIS AND EMPHYSEMA: Chronic | ICD-10-CM

## 2024-08-21 DIAGNOSIS — F17.210 NICOTINE DEPENDENCE, CIGARETTES, UNCOMPLICATED: Chronic | ICD-10-CM

## 2024-08-21 DIAGNOSIS — Z13.31 POSITIVE DEPRESSION SCREENING: Chronic | ICD-10-CM

## 2024-08-21 DIAGNOSIS — K51.019 ULCERATIVE PANCOLITIS WITH COMPLICATION: Chronic | ICD-10-CM

## 2024-08-21 DIAGNOSIS — M54.42 CHRONIC MIDLINE LOW BACK PAIN WITH BILATERAL SCIATICA: Primary | Chronic | ICD-10-CM

## 2024-08-21 DIAGNOSIS — R11.0 CHRONIC NAUSEA: Chronic | ICD-10-CM

## 2024-08-21 DIAGNOSIS — M54.41 CHRONIC MIDLINE LOW BACK PAIN WITH BILATERAL SCIATICA: Primary | Chronic | ICD-10-CM

## 2024-08-21 DIAGNOSIS — G89.29 CHRONIC MIDLINE LOW BACK PAIN WITH BILATERAL SCIATICA: Primary | Chronic | ICD-10-CM

## 2024-08-21 LAB
ALBUMIN SERPL BCP-MCNC: 4.1 G/DL (ref 3.5–5)
ALBUMIN/GLOB SERPL: 1.5 {RATIO}
ALP SERPL-CCNC: 68 U/L (ref 55–142)
ALT SERPL W P-5'-P-CCNC: 20 U/L (ref 13–56)
ANION GAP SERPL CALCULATED.3IONS-SCNC: 6 MMOL/L (ref 7–16)
AST SERPL W P-5'-P-CCNC: 15 U/L (ref 15–37)
BASOPHILS # BLD AUTO: 0.08 K/UL (ref 0–0.2)
BASOPHILS NFR BLD AUTO: 0.9 % (ref 0–1)
BILIRUB SERPL-MCNC: 0.3 MG/DL (ref ?–1.2)
BUN SERPL-MCNC: 28 MG/DL (ref 7–18)
BUN/CREAT SERPL: 36 (ref 6–20)
CALCIUM SERPL-MCNC: 9.6 MG/DL (ref 8.5–10.1)
CHLORIDE SERPL-SCNC: 108 MMOL/L (ref 98–107)
CHOLEST SERPL-MCNC: 198 MG/DL (ref 0–200)
CHOLEST/HDLC SERPL: 3.5 {RATIO}
CO2 SERPL-SCNC: 32 MMOL/L (ref 21–32)
CREAT SERPL-MCNC: 0.78 MG/DL (ref 0.55–1.02)
DIFFERENTIAL METHOD BLD: ABNORMAL
EGFR (NO RACE VARIABLE) (RUSH/TITUS): 77 ML/MIN/1.73M2
EOSINOPHIL # BLD AUTO: 0.23 K/UL (ref 0–0.5)
EOSINOPHIL NFR BLD AUTO: 2.6 % (ref 1–4)
ERYTHROCYTE [DISTWIDTH] IN BLOOD BY AUTOMATED COUNT: 13.1 % (ref 11.5–14.5)
EST. AVERAGE GLUCOSE BLD GHB EST-MCNC: 117 MG/DL
FOLATE SERPL-MCNC: 16.6 NG/ML (ref 3.1–17.5)
GLOBULIN SER-MCNC: 2.8 G/DL (ref 2–4)
GLUCOSE SERPL-MCNC: 90 MG/DL (ref 74–106)
HBA1C MFR BLD HPLC: 5.7 % (ref 4.5–6.6)
HCT VFR BLD AUTO: 42.2 % (ref 38–47)
HDLC SERPL-MCNC: 56 MG/DL (ref 40–60)
HGB BLD-MCNC: 13.5 G/DL (ref 12–16)
IMM GRANULOCYTES # BLD AUTO: 0.03 K/UL (ref 0–0.04)
IMM GRANULOCYTES NFR BLD: 0.3 % (ref 0–0.4)
LDLC SERPL CALC-MCNC: 114 MG/DL
LDLC/HDLC SERPL: 2 {RATIO}
LYMPHOCYTES # BLD AUTO: 2.45 K/UL (ref 1–4.8)
LYMPHOCYTES NFR BLD AUTO: 27.3 % (ref 27–41)
MACROCYTES BLD QL SMEAR: ABNORMAL
MCH RBC QN AUTO: 31.5 PG (ref 27–31)
MCHC RBC AUTO-ENTMCNC: 32 G/DL (ref 32–36)
MCV RBC AUTO: 98.4 FL (ref 80–96)
MONOCYTES # BLD AUTO: 0.62 K/UL (ref 0–0.8)
MONOCYTES NFR BLD AUTO: 6.9 % (ref 2–6)
MPC BLD CALC-MCNC: 13.2 FL (ref 9.4–12.4)
NEUTROPHILS # BLD AUTO: 5.56 K/UL (ref 1.8–7.7)
NEUTROPHILS NFR BLD AUTO: 62 % (ref 53–65)
NONHDLC SERPL-MCNC: 142 MG/DL
NRBC # BLD AUTO: 0 X10E3/UL
NRBC, AUTO (.00): 0 %
PLATELET # BLD AUTO: 248 K/UL (ref 150–400)
PLATELET MORPHOLOGY: ABNORMAL
POTASSIUM SERPL-SCNC: 4 MMOL/L (ref 3.5–5.1)
PROT SERPL-MCNC: 6.9 G/DL (ref 6.4–8.2)
RBC # BLD AUTO: 4.29 M/UL (ref 4.2–5.4)
SODIUM SERPL-SCNC: 142 MMOL/L (ref 136–145)
TRIGL SERPL-MCNC: 140 MG/DL (ref 35–150)
TSH SERPL DL<=0.005 MIU/L-ACNC: 1.03 UIU/ML (ref 0.36–3.74)
VIT B12 SERPL-MCNC: 432 PG/ML (ref 193–986)
VLDLC SERPL-MCNC: 28 MG/DL
WBC # BLD AUTO: 8.97 K/UL (ref 4.5–11)

## 2024-08-21 PROCEDURE — 80061 LIPID PANEL: CPT | Mod: ,,, | Performed by: CLINICAL MEDICAL LABORATORY

## 2024-08-21 PROCEDURE — 82607 VITAMIN B-12: CPT | Mod: ,,, | Performed by: CLINICAL MEDICAL LABORATORY

## 2024-08-21 PROCEDURE — 80053 COMPREHEN METABOLIC PANEL: CPT | Mod: ,,, | Performed by: CLINICAL MEDICAL LABORATORY

## 2024-08-21 PROCEDURE — 99214 OFFICE O/P EST MOD 30 MIN: CPT | Mod: ,,, | Performed by: NURSE PRACTITIONER

## 2024-08-21 PROCEDURE — 84443 ASSAY THYROID STIM HORMONE: CPT | Mod: ,,, | Performed by: CLINICAL MEDICAL LABORATORY

## 2024-08-21 PROCEDURE — 71046 X-RAY EXAM CHEST 2 VIEWS: CPT | Mod: TC,RHCUB,FY | Performed by: NURSE PRACTITIONER

## 2024-08-21 PROCEDURE — 85025 COMPLETE CBC W/AUTO DIFF WBC: CPT | Mod: ,,, | Performed by: CLINICAL MEDICAL LABORATORY

## 2024-08-21 PROCEDURE — 82746 ASSAY OF FOLIC ACID SERUM: CPT | Mod: ,,, | Performed by: CLINICAL MEDICAL LABORATORY

## 2024-08-21 PROCEDURE — 83036 HEMOGLOBIN GLYCOSYLATED A1C: CPT | Mod: ,,, | Performed by: CLINICAL MEDICAL LABORATORY

## 2024-08-21 RX ORDER — CYCLOBENZAPRINE HCL 5 MG
5 TABLET ORAL 2 TIMES DAILY PRN
Qty: 60 TABLET | Refills: 2 | Status: SHIPPED | OUTPATIENT
Start: 2024-08-21

## 2024-08-21 RX ORDER — ALBUTEROL SULFATE 90 UG/1
2 INHALANT RESPIRATORY (INHALATION) EVERY 4 HOURS PRN
Qty: 18 G | Refills: 5 | Status: SHIPPED | OUTPATIENT
Start: 2024-08-21

## 2024-08-21 RX ORDER — UMECLIDINIUM BROMIDE AND VILANTEROL TRIFENATATE 62.5; 25 UG/1; UG/1
1 POWDER RESPIRATORY (INHALATION) DAILY
Qty: 90 EACH | Refills: 3 | Status: SHIPPED | OUTPATIENT
Start: 2024-08-21 | End: 2025-08-21

## 2024-08-21 RX ORDER — PROMETHAZINE HYDROCHLORIDE 25 MG/1
25 TABLET ORAL EVERY 6 HOURS PRN
Qty: 60 TABLET | Refills: 2 | Status: SHIPPED | OUTPATIENT
Start: 2024-08-21

## 2024-08-21 RX ORDER — GABAPENTIN 300 MG/1
300 CAPSULE ORAL 3 TIMES DAILY
Qty: 270 CAPSULE | Refills: 1 | Status: SHIPPED | OUTPATIENT
Start: 2024-08-21 | End: 2025-02-17

## 2024-08-21 RX ORDER — LISINOPRIL 20 MG/1
20 TABLET ORAL 2 TIMES DAILY
Qty: 180 TABLET | Refills: 3 | Status: SHIPPED | OUTPATIENT
Start: 2024-08-21

## 2024-08-21 NOTE — ASSESSMENT & PLAN NOTE
Not controlled, but chose not to return to pain management. Norco does not help. Gabapentin helps.   Per  last girma filled 3/28/24.

## 2024-08-21 NOTE — ASSESSMENT & PLAN NOTE
Reports GI, Dr. Pendleton, tried her on multiple meds for UC, but the only thing that helps is prednisone which he told her she can't stay on.  UC not controlled - poor appetite, diarrhea, weight loss.

## 2024-08-21 NOTE — ASSESSMENT & PLAN NOTE
Chronic, not controlled, refuses daily med states she has tried them all and they do not work.  Denies thoughts of harming self or others.

## 2024-08-21 NOTE — ASSESSMENT & PLAN NOTE
Stable, continue Anoro daily and albuterol p.r.n.   Will not quit smoking and voices understanding of short and long term risks of continued smoking.

## 2024-08-21 NOTE — ASSESSMENT & PLAN NOTE
Not controlled  Non-compliant with med, last filled 2/13/24 per med adherence report.  Refill/resume lisinopril 20 mg 2 times per day and stressed daily medication adherence.

## 2024-08-21 NOTE — PROGRESS NOTES
[facility]  [address] [place] [address]  Phone: [phone]  Fax: [phone]       PATIENT NAME: Tawny Rutledge   : 1945    AGE: 79 y.o. DATE OF ENCOUNTER: 24    MRN: [id number]      PCP: Barbie Segundo FNP    Subjective:     Reason for Visit / Chief Complaint:     274}  Chief Complaint   Patient presents with    Follow-up     Patient reports to the clinic today for yearly follow up.    Medication Refill     Patient requests refill on her medications.    Anxiety     Patient has increased anxiety due to family troubles.    Depression     PHQ-9 score of 15, moderately severe.    Health Maintenance     Care gaps addressed, patient declines all vaccines and screenings today.    Arianna Pugh CMA     Presents past due for f/u and med refills.  Doesn't keep appointments as advised, last appt 23 for HTN, anxiety, and LBP.    Lots of personal stressors, has tried several meds for depression and anxiety and states they don't work.  Will not see psych.    Review of Systems:     Review of Systems   Constitutional:  Positive for appetite change (poor appetite) and fatigue.   Respiratory:  Positive for cough, shortness of breath and wheezing.         COPD   Cardiovascular: Negative.    Gastrointestinal:  Positive for diarrhea (chronic, has UC) and nausea. Negative for abdominal pain and vomiting.   Genitourinary: Negative.    Musculoskeletal:  Positive for back pain.   Skin: Negative.    Neurological:  Positive for headaches.   Psychiatric/Behavioral:  Negative for confusion, dysphoric mood, hallucinations, self-injury and suicidal ideas. The patient is nervous/anxious.        Allergies and Meds: 274}     Review of patient's allergies indicates:   Allergen Reactions    Opioids - morphine analogues Nausea And Vomiting        Current Outpatient Medications   Medication Sig Dispense Refill    albuterol (PROVENTIL/VENTOLIN HFA) 90 mcg/actuation inhaler Inhale 2  puffs into the lungs every 4 (four) hours as needed for Wheezing or Shortness of Breath. Rescue 18 g 5    ANORO ELLIPTA 62.5-25 mcg/actuation DsDv Inhale 1 puff into the lungs once daily. 90 each 3    cyclobenzaprine (FLEXERIL) 5 MG tablet Take 1 tablet (5 mg total) by mouth 2 (two) times daily as needed for Muscle spasms. 60 tablet 2    gabapentin (NEURONTIN) 300 MG capsule Take 1 capsule (300 mg total) by mouth 3 (three) times daily. 270 capsule 1    lisinopriL (PRINIVIL,ZESTRIL) 20 MG tablet Take 1 tablet (20 mg total) by mouth 2 (two) times daily. 180 tablet 3    promethazine (PHENERGAN) 25 MG tablet Take 1 tablet (25 mg total) by mouth every 6 (six) hours as needed for Nausea. 60 tablet 2     No current facility-administered medications for this visit.       Labs:274}   I have reviewed labs below:    Lab Results   Component Value Date    WBC 8.74 12/20/2021    RBC 4.49 12/20/2021    HGB 14.1 12/20/2021    HCT 43.4 12/20/2021     12/20/2021     10/25/2022    K 4.6 10/25/2022     10/25/2022    CALCIUM 9.6 10/25/2022     (H) 10/25/2022    BUN 27 (H) 10/25/2022    CREATININE 0.92 10/25/2022    EGFRNONAA 89 12/20/2021    ALT 20 10/25/2022    AST 14 (L) 10/25/2022    INR 0.97 07/19/2021    CHOL 186 10/25/2022    TRIG 120 10/25/2022    HDL 57 10/25/2022    LDLCALC 105 10/25/2022    TSH 1.100 12/20/2021     Medical History: 274}     Past Medical History:   Diagnosis Date    Anxiety     Colitis     COPD with chronic bronchitis and emphysema     Diverticulosis of large intestine without perforation or abscess without bleeding 03/24/2023    See Dr. Keerthi Carter report    Hyperlipidemia     Hypertension     Lumbar disc disease     Nicotine dependence, cigarettes, uncomplicated     Noninfectious gastroenteritis 03/24/2023    See Dr. Keerthi Carter Report    Pneumonia     Ulcerative colitis     Dr. Thaggard      Social History     Tobacco Use   Smoking Status Every Day    Types: Cigarettes  "  Smokeless Tobacco Never      Past Surgical History:   Procedure Laterality Date    SHOULDER OPEN ROTATOR CUFF REPAIR      Left shoulder, Dr. Penn        Health Maintenance:      Health Maintenance Topics with due status: Not Due       Topic Last Completion Date    Lipid Panel 10/25/2022    Colonoscopy 03/24/2023       Objective:  274}   Vital Signs  Temp: 98.6 °F (37 °C)  Temp Source: Oral  Pulse: 77  Resp: 18  SpO2: 97 %  BP: 138/80  BP Location: Left arm  Patient Position: Sitting  Height and Weight  Height: 5' 1.5" (156.2 cm)  Weight: 43.9 kg (96 lb 12.8 oz)  BSA (Calculated - sq m): 1.38 sq meters  BMI (Calculated): 18  Weight in (lb) to have BMI = 25: 134.2    Over the last two weeks how often have you been bothered by little interest or pleasure in doing things: 3  Over the last two weeks how often have you been bothered by feeling down, depressed or hopeless: 3  PHQ-2 Total Score: 6  PHQ-9 Score: 15  PHQ-9 Interpretation: Moderately Severe    Wt Readings from Last 3 Encounters:   08/21/24 43.9 kg (96 lb 12.8 oz)   07/11/23 47.2 kg (104 lb)   10/25/22 46.4 kg (102 lb 3.2 oz)     Physical Exam  Vitals and nursing note reviewed.   Constitutional:       Appearance: Normal appearance. She is underweight. She is not ill-appearing.   HENT:      Head: Normocephalic.   Eyes:      Conjunctiva/sclera: Conjunctivae normal.   Neck:      Thyroid: No thyromegaly.   Cardiovascular:      Rate and Rhythm: Normal rate and regular rhythm.      Pulses: Normal pulses.      Heart sounds: Normal heart sounds.   Pulmonary:      Effort: Pulmonary effort is normal. No respiratory distress.      Breath sounds: Decreased breath sounds present. No wheezing, rhonchi or rales.   Abdominal:      General: Bowel sounds are normal.      Palpations: Abdomen is soft.      Tenderness: There is no abdominal tenderness. There is no guarding.   Musculoskeletal:      Cervical back: Neck supple.      Right lower leg: No edema.      Left lower " leg: No edema.   Lymphadenopathy:      Cervical: No cervical adenopathy.   Skin:     General: Skin is warm and dry.      Coloration: Skin is not jaundiced or pale.   Neurological:      General: No focal deficit present.      Mental Status: She is alert and oriented to person, place, and time.   Psychiatric:         Attention and Perception: Attention normal.         Mood and Affect: Mood is anxious. Affect is tearful.         Speech: Speech normal.         Behavior: Behavior normal.         Thought Content: Thought content normal.         Cognition and Memory: Cognition normal.         Judgment: Judgment normal.          Assessment and Plan: 274}     1. Chronic midline low back pain with bilateral sciatica  Overview:  Originally saw spine specialist, Dr. Ward, for chronic LBP & DDD; and he started gabapentin.  Was treated at [facility] for a while.    Assessment & Plan:  Not controlled, but chose not to return to pain management. Norco does not help. Gabapentin helps.   Per  last girma filled 3/28/24.    Orders:  -     cyclobenzaprine (FLEXERIL) 5 MG tablet; Take 1 tablet (5 mg total) by mouth 2 (two) times daily as needed for Muscle spasms.  Dispense: 60 tablet; Refill: 2  -     gabapentin (NEURONTIN) 300 MG capsule; Take 1 capsule (300 mg total) by mouth 3 (three) times daily.  Dispense: 270 capsule; Refill: 1    2. Benign essential hypertension  Assessment & Plan:  Not controlled  Non-compliant with med, last filled 2/13/24 per med adherence report.  Refill/resume lisinopril 20 mg 2 times per day and stressed daily medication adherence.    Orders:  -     lisinopriL (PRINIVIL,ZESTRIL) 20 MG tablet; Take 1 tablet (20 mg total) by mouth 2 (two) times daily.  Dispense: 180 tablet; Refill: 3  -     CBC Auto Differential; Future; Expected date: 08/21/2024  -     Comprehensive Metabolic Panel; Future; Expected date: 08/21/2024  -     Lipid Panel; Future; Expected date: 08/21/2024  -     TSH; Future; Expected  date: 08/21/2024    3. COPD with chronic bronchitis and emphysema  Assessment & Plan:  Stable, continue Anoro daily and albuterol p.r.n.   Will not quit smoking and voices understanding of short and long term risks of continued smoking.    Orders:  -     ANORO ELLIPTA 62.5-25 mcg/actuation DsDv; Inhale 1 puff into the lungs once daily.  Dispense: 90 each; Refill: 3  -     albuterol (PROVENTIL/VENTOLIN HFA) 90 mcg/actuation inhaler; Inhale 2 puffs into the lungs every 4 (four) hours as needed for Wheezing or Shortness of Breath. Rescue  Dispense: 18 g; Refill: 5  -     X-Ray Chest PA And Lateral; Future; Expected date: 08/21/2024    4. Positive depression screening  Comments:  I have reviewed the positive depression score which warrants active treatment with psychotherapy and/or medications.  Refuses treatment, states does not help.    5. Chronic nausea  -     promethazine (PHENERGAN) 25 MG tablet; Take 1 tablet (25 mg total) by mouth every 6 (six) hours as needed for Nausea.  Dispense: 60 tablet; Refill: 2    6. Encounter for long-term (current) use of other medications  -     Comprehensive Metabolic Panel; Future; Expected date: 08/21/2024  -     TSH; Future; Expected date: 08/21/2024  -     Hemoglobin A1C; Future; Expected date: 08/21/2024  -     Vitamin B12 & Folate; Future; Expected date: 08/21/2024    7. Major depressive disorder, single episode, moderate  Assessment & Plan:  Chronic, not controlled, refuses daily med states she has tried them all and they do not work.  Denies thoughts of harming self or others.      8. Ulcerative pancolitis with complication  Overview:  Dr. Pendleton    Assessment & Plan:  Reports GI, Dr. Pendleton, tried her on multiple meds for UC, but the only thing that helps is prednisone which he told her she can't stay on.  UC not controlled - poor appetite, diarrhea, weight loss.      9. Nicotine dependence, cigarettes, uncomplicated  Assessment & Plan:  Smoking cessation strongly  advised.  Impact of smoking on health discussed including both the short and long-term benefits of smoking cessation to decrease heart, vascular, and pulmonary effects and the risk for cancers.   Declines smoking cessation.  Declines LDCT lung cancer screening.  Declines Prevnar 20.  Declines taking statin.        Diagnosis, risks, benefits, and side effects of any meds and treatment plan were discussed with the patient.  Patient to call or follow-up with any new or worsening symptoms or problems prior to next appointment.  Go to ER for any urgent complications.  All questions were answered to the satisfaction of the patient, and pt verbalized understanding and agreement to treatment plan.      Follow up in about 6 months (around 2/21/2025) for hypertension, chronic LBP, med refill.  Refuses AWV.    Signature:  MATT Otto-BC    Future Appointments   Date Time Provider Department Center   10/23/2024  3:00 PM AWV NURSE, [facility] FAMILY MEDICINE [facility]TE Gutierrez   2/26/2025  3:00 PM Barbie Segundo FNP [facility] [place] Gutierrez

## 2024-08-21 NOTE — PATIENT INSTRUCTIONS
Patient Education       Preventing Falls   The Basics   Written by the doctors and editors at [facility]   Am I at risk of falling? -- Your risk of falling increases as you grow older. That's because getting older can make it harder to walk steadily and keep your balance. Also, the effects of falls are more serious in older people.  Overall, 3 to 4 out of every 10 people over the age of 65 fall each year. Up to 75 percent of people who fracture a hip never recover to the point they were before they had their fracture. If you have fallen in the past, you are at higher risk of falling again.  Several things can increase your risk of a fall, including:  Illness  A change in the medicines you take  An unsafe or unfamiliar setting (for example, a room with rugs or furniture that might trip you, or an area you don't know well)  How can my doctor help me to avoid falling? -- Your doctor can talk to you about the following things:  Past falls - It is important to tell your doctor about any times you have fallen or almost fallen. He or she can then suggest ways to prevent another fall.  Your health conditions - Some health problems can put you at risk of falling. These include conditions that affect eyesight, hearing, muscle strength, or balance.  The medicines you take - Certain medicines can increase the risk of falling. These include some medicines that are used for sleeping problems, anxiety, high blood pressure, or depression. Adding new medicines, or changing doses of some medicines, can also affect your risk of falling.  The more your doctor knows about your situation, the better he or she will be able to help you. For example, if you fell because you have a condition that causes pain, your doctor might suggest treatments to deal with the pain. Or if one of your medicines is making you dizzy and more likely to fall, your doctor might switch you to a different medicine.  Is there anything I can do on my own? -- Yes. To  help keep from falling, you can:  Make your home safer - To avoid falling at home, get rid of things that might make you trip or slip. This might include furniture, electrical cords, clutter, and loose rugs (figure 1). Keep your home well-lit so that you can easily see where you are going. Avoid storing things in high places so you don't have to reach or climb.  Wear sturdy shoes that fit well - Wearing shoes with high heels or slippery soles, or shoes that are too loose, can lead to falls. Walking around in bare feet, or only socks, can also increase your risk of falling.  Take vitamin D pills - Taking vitamin D might lower the risk of falls in older people. This is because vitamin D helps make bones and muscles stronger. Your doctor can talk to you about whether you should take extra vitamin D, and how much.  Stay active - Exercising on a regular basis can help lower your risk of falling. It might also help prevent you from getting hurt if you do fall. It is best to do a few different activities that help with both strength and balance. There are many kinds of exercise that can be safe for older people. These include walking, swimming, and [employer] Chi (a Chinese martial art that involves slow, gentle movements).  Use a cane, walker, and other safety devices - If your doctor recommends that you use a cane or walker, be sure that it's the right size and you know how to use it. There are other devices that might help you avoid falling, too. These include grab bars or a sturdy seat for the shower, non-slip bath mats, and hand rails or treads for the stairs (to prevent slipping).  If you worry that you could fall, there are also alarm buttons that let you call for help if you fall and can't get up.  What should I do if I fall? -- If you fall, see your doctor right away, even if you aren't hurt. Your doctor can try to figure out what caused you to fall, and how likely you are to fall again. He or she will do an exam and  talk to you about your health problems, medicines, and activities. Then he or she can suggest things you can do to avoid falling again.  Many older people have a hard time recovering after a fall. Doing things to prevent falling can help you to protect your health and independence.  All topics are updated as new evidence becomes available and our peer review process is complete.  This topic retrieved from [employer] on: Sep 21, 2021.  Topic 61432 Version 18.0  Release: 29.4.2 - C29.263  © 2021 UpToDate, Inc. and/or its affiliates. All rights reserved.  figure 1: How to avoid falling at home     This picture shows some of the things that can cause a fall in your home. Look around and remove any loose rugs, electrical cords, clutter, or furniture that could trip you.  Graphic 51759 Version 1.0    Consumer Information Use and Disclaimer   This information is not specific medical advice and does not replace information you receive from your health care provider. This is only a brief summary of general information. It does NOT include all information about conditions, illnesses, injuries, tests, procedures, treatments, therapies, discharge instructions or life-style choices that may apply to you. You must talk with your health care provider for complete information about your health and treatment options. This information should not be used to decide whether or not to accept your health care provider's advice, instructions or recommendations. Only your health care provider has the knowledge and training to provide advice that is right for you. The use of this information is governed by the [employer] End User License Agreement, available at https://[email or web address]/en/solutions/[employer]/about/afia.The use of [employer] content is governed by the [employer] Terms of Use. ©2021 UpToDate, Inc. All rights reserved.  Copyright   © 2021 UpToDate, Inc. and/or its affiliates. All rights reserved.  Patient Education       Quitting  "Smoking   The Basics   Written by the doctors and editors at [facility], [place]   What are the benefits of quitting smoking? -- Quitting smoking can dramatically improve your health and help you live longer. It lowers your risk of heart disease, lung disease, kidney failure, infection, and cancer.   Quitting smoking can also lower your chances of getting osteoporosis, a condition that makes your bones weak. Plus, it can help your skin look younger and reduce the chances that you will have problems with sex.  Quitting is not easy for most people, and it can take several tries to completely quit. But help and support are available. Quitting smoking will improve your health no matter how old you are, even if you have smoked for a long time.   What should I do if I want to quit smoking? -- It's a good idea to start by talking with your doctor or nurse. It is possible to quit on your own, without help. But getting help greatly increases your chances of quitting successfully.  When you are ready to quit, you will make a plan to:  Set a quit date  Tell your family and friends that you plan to quit  Plan ahead for the challenges you will face, such as cigarette cravings  Remove cigarettes from your home, car, and work  How can my doctor or nurse help? -- Your doctor or nurse can give you advice on the best way to quit. They can also give you medicines to:  Reduce your craving for cigarettes  Reduce your "withdrawal" symptoms (symptoms that happen when you stop smoking)  Your doctor or nurse can also help you find a counselor to talk to. For most people who are trying to quit smoking, it works best to use both medicines and counseling.  You can also get help from a free phone line (3-703-QUIT-NOW or 1-648.832.1071) or go online to www.smokefree.gov.  What are the symptoms of withdrawal? -- When you stop smoking, you might have symptoms such as:  Trouble sleeping  Feeling irritable, anxious, or restless  Getting frustrated or " angry  Having trouble thinking clearly  These symptoms can be hard to deal with, which is why it can be so hard to quit. But medicines can help.  Some people who stop smoking become temporarily depressed. Some people need treatment for depression, such as counseling or medicines or both. People with depression might:  No longer enjoy or care about doing the things they used to like to do  Feel sad, down, hopeless, nervous, or cranky most of the day, almost every day  Lose or gain weight  Sleep too much or too little  Feel tired or like they have no energy  Feel guilty or like they are worth nothing  Forget things or feel confused  Move and speak more slowly than usual  Act restless or have trouble staying still  Think about death or suicide  If you think you might be depressed, tell your doctor or nurse right away. They can talk to you about your symptoms and recommend treatment if needed.  If you ever feel like you might hurt yourself, go straight to the nearest emergency department or call for an ambulance (in the [place] and [place], dial 9-1-1). You can also call your doctor or nurse and tell them it is an emergency, or reach the  National Suicide Prevention Lifeline at 1-807.314.4329 or www.suicidepreventionlifeline.org.  How does counseling work? -- A counselor can help you figure out:  What triggers you to want to smoke, and how to handle these situations  How to resist cravings  What you can do differently if you have tried to quit before  You can meet with a counselor in one-on-one sessions or as part of a group. There are other ways to get counseling, too, such as over the phone, through text messaging, or online.   How do medicines help you stop smoking? -- Different medicines work in different ways:  Nicotine replacement therapy - Nicotine is the main drug in cigarettes, and the reason they are addictive. These medicines reduce your body's craving for nicotine. They also help with withdrawal symptoms.  There  "are different forms of nicotine replacement, including skin patches, lozenges, gum, nasal sprays, and inhalers. Most can be bought without a prescription. Also, health insurance might cover some or all of the cost.  It often helps to use 2 forms of nicotine replacement. For example, you might wear a patch all the time, plus use gum or lozenges when you get a craving to smoke.  Varenicline - Varenicline (brand names: Chantix, Champix) is a prescription medicine that reduces withdrawal symptoms and cigarette cravings. Varenicline can increase the effects of alcohol in some people. It's a good idea to limit drinking while you're taking it, at least until you know how it affects you.  Even if you are not yet ready to commit to a quit date, varenicline can help reduce cravings. This can make it easier to quit when you are ready.  Bupropion - Bupropion (sample brand names: Wellbutrin, Zyban) is a prescription medicine that reduces your desire to smoke. It is also available in a generic version, which is cheaper than the brand name medicines. Doctors do not usually prescribe bupropion for people with seizures or who have had seizures in the past.  It might also be helpful to combine nicotine replacement with bupropion or varenicline. In some cases, a person might even take both bupropion and varenicline. Your doctor or nurse can help you figure out the best combination for you.  What about e-cigarettes? -- Sometimes people wonder if using electronic cigarettes, or "e-cigarettes," can help them quit smoking. Using e-cigarettes is also called "vaping." Doctors do not recommend e-cigarettes in place of medicines and counseling. That's because e-cigarettes still contain nicotine as well as other substances that might be harmful. It's also not clear how they can affect a person's health in the long term.  What if I am pregnant and I smoke? -- If you are pregnant, it's really important for the health of your baby that you quit. " Ask your doctor what options you have, and what is safest for your baby.  Will I gain weight if I quit? -- You might gain a few pounds. This can be frustrating for some people, but it's important to remember that you are improving your health by quitting smoking. You can help prevent gaining a lot of weight by staying active and eating a healthy diet.  What if I am not able to quit? -- If you don't quit on your first try, or if you quit but then start smoking again, don't give up hope. Lots of people have to try more than once before they are able to completely quit.  It might help to try to understand why quitting did not work. There might be something you can do differently when you try again. It can help to figure out what situations make you want to smoke, so you can avoid them.   What else can I do to improve my chances of quitting? -- You can:  Get regular exercise. Any type of physical activity, even gentle forms of movement, is good for your health. Physical activity can also help reduce stress.  Stay away from smokers and places that make you want to smoke. If people close to you smoke, ask them to quit with you or avoid smoking around you.  Carry gum, hard candy, or something to put in your mouth. If you get a craving for a cigarette, try one of these instead.  Don't give up, even if you start smoking again. It takes most people a few tries before they succeed.  All topics are updated as new evidence becomes available and our peer review process is complete.  This topic retrieved from [employer] on: Sep 21, 2021.  Topic 12115 Version 22.0  Release: 29.4.2 - C29.263  © 2021 UpToDate, Inc. and/or its affiliates. All rights reserved.  Consumer Information Use and Disclaimer   This information is not specific medical advice and does not replace information you receive from your health care provider. This is only a brief summary of general information. It does NOT include all information about conditions,  illnesses, injuries, tests, procedures, treatments, therapies, discharge instructions or life-style choices that may apply to you. You must talk with your health care provider for complete information about your health and treatment options. This information should not be used to decide whether or not to accept your health care provider's advice, instructions or recommendations. Only your health care provider has the knowledge and training to provide advice that is right for you. The use of this information is governed by the [employer] End User License Agreement, available at https://[email or web address]/en/solutions/[employer]/about/afia.The use of [employer] content is governed by the [employer] Terms of Use. ©2021 [employer]. All rights reserved.  Copyright   © 2021 UpToDate, Inc. and/or its affiliates. All rights reserved.

## 2024-08-21 NOTE — ASSESSMENT & PLAN NOTE
Smoking cessation strongly advised.  Impact of smoking on health discussed including both the short and long-term benefits of smoking cessation to decrease heart, vascular, and pulmonary effects and the risk for cancers.   Declines smoking cessation.  Declines LDCT lung cancer screening.  Declines Prevnar 20.  Declines taking statin.

## 2025-02-26 ENCOUNTER — OFFICE VISIT (OUTPATIENT)
Dept: FAMILY MEDICINE | Facility: CLINIC | Age: 80
End: 2025-02-26
Payer: MEDICARE

## 2025-02-26 VITALS
SYSTOLIC BLOOD PRESSURE: 137 MMHG | RESPIRATION RATE: 20 BRPM | WEIGHT: 100.81 LBS | TEMPERATURE: 98 F | HEART RATE: 72 BPM | BODY MASS INDEX: 18.55 KG/M2 | HEIGHT: 62 IN | OXYGEN SATURATION: 95 % | DIASTOLIC BLOOD PRESSURE: 80 MMHG

## 2025-02-26 DIAGNOSIS — I10 BENIGN ESSENTIAL HYPERTENSION: Primary | Chronic | ICD-10-CM

## 2025-02-26 DIAGNOSIS — M54.41 CHRONIC MIDLINE LOW BACK PAIN WITH BILATERAL SCIATICA: Chronic | ICD-10-CM

## 2025-02-26 DIAGNOSIS — K51.019 ULCERATIVE PANCOLITIS WITH COMPLICATION: Chronic | ICD-10-CM

## 2025-02-26 DIAGNOSIS — J44.89 COPD WITH CHRONIC BRONCHITIS AND EMPHYSEMA: Chronic | ICD-10-CM

## 2025-02-26 DIAGNOSIS — J43.9 COPD WITH CHRONIC BRONCHITIS AND EMPHYSEMA: Chronic | ICD-10-CM

## 2025-02-26 DIAGNOSIS — M54.42 CHRONIC MIDLINE LOW BACK PAIN WITH BILATERAL SCIATICA: Chronic | ICD-10-CM

## 2025-02-26 DIAGNOSIS — F32.1 MAJOR DEPRESSIVE DISORDER, SINGLE EPISODE, MODERATE: Chronic | ICD-10-CM

## 2025-02-26 DIAGNOSIS — F41.9 ANXIETY: Chronic | ICD-10-CM

## 2025-02-26 DIAGNOSIS — G89.29 CHRONIC MIDLINE LOW BACK PAIN WITH BILATERAL SCIATICA: Chronic | ICD-10-CM

## 2025-02-26 DIAGNOSIS — J22 LRTI (LOWER RESPIRATORY TRACT INFECTION): ICD-10-CM

## 2025-02-26 RX ORDER — FLUOXETINE HYDROCHLORIDE 20 MG/1
20 CAPSULE ORAL DAILY
Qty: 90 CAPSULE | Refills: 1 | Status: SHIPPED | OUTPATIENT
Start: 2025-02-26

## 2025-02-26 RX ORDER — AZITHROMYCIN 250 MG/1
TABLET, FILM COATED ORAL
Qty: 6 TABLET | Refills: 0 | Status: SHIPPED | OUTPATIENT
Start: 2025-02-26

## 2025-02-26 RX ORDER — CEFTRIAXONE 1 G/1
1 INJECTION, POWDER, FOR SOLUTION INTRAMUSCULAR; INTRAVENOUS
Status: COMPLETED | OUTPATIENT
Start: 2025-02-26 | End: 2025-02-26

## 2025-02-26 RX ORDER — LISINOPRIL 20 MG/1
20 TABLET ORAL 2 TIMES DAILY
Qty: 180 TABLET | Refills: 3 | Status: SHIPPED | OUTPATIENT
Start: 2025-02-26

## 2025-02-26 RX ORDER — UMECLIDINIUM BROMIDE AND VILANTEROL TRIFENATATE 62.5; 25 UG/1; UG/1
1 POWDER RESPIRATORY (INHALATION) DAILY
Qty: 90 EACH | Refills: 3 | Status: SHIPPED | OUTPATIENT
Start: 2025-02-26 | End: 2026-02-26

## 2025-02-26 RX ORDER — ALBUTEROL SULFATE 90 UG/1
2 INHALANT RESPIRATORY (INHALATION) EVERY 4 HOURS PRN
Qty: 18 G | Refills: 5 | Status: SHIPPED | OUTPATIENT
Start: 2025-02-26

## 2025-02-26 RX ORDER — GABAPENTIN 300 MG/1
300 CAPSULE ORAL 3 TIMES DAILY
Qty: 270 CAPSULE | Refills: 1 | Status: SHIPPED | OUTPATIENT
Start: 2025-02-26 | End: 2025-08-25

## 2025-02-26 RX ORDER — DEXAMETHASONE SODIUM PHOSPHATE 4 MG/ML
6 INJECTION, SOLUTION INTRA-ARTICULAR; INTRALESIONAL; INTRAMUSCULAR; INTRAVENOUS; SOFT TISSUE
Status: COMPLETED | OUTPATIENT
Start: 2025-02-26 | End: 2025-02-26

## 2025-02-26 RX ADMIN — DEXAMETHASONE SODIUM PHOSPHATE 6 MG: 4 INJECTION, SOLUTION INTRA-ARTICULAR; INTRALESIONAL; INTRAMUSCULAR; INTRAVENOUS; SOFT TISSUE at 04:02

## 2025-02-26 RX ADMIN — CEFTRIAXONE 1 G: 1 INJECTION, POWDER, FOR SOLUTION INTRAMUSCULAR; INTRAVENOUS at 04:02

## 2025-02-26 NOTE — PROGRESS NOTES
[facility]  [address] [place] [address]  Phone: [phone]  Fax: [phone]       PATIENT NAME: Tawny Rutledge   : 1945    AGE: 79 y.o. DATE OF ENCOUNTER: 25    MRN: [id number]      PCP: Barbie Segundo FNP    Subjective:   CHANGE CHIEF COMPLAINT      :[id number]}274}  Chief Complaint   Patient presents with    Hypertension    Medication Refill    Follow-up     Patient presents to clinic for 6 month follow up HTN and med refill.    Sinus Problem    Anxiety     Ambient listening device failed to record notes from visit  Patient is a 79-year-old female who will turn 80 in July who presents for 6 mth f/u HTN which is fairly well controlled on lisinopril 20 mg 2 times per day at 137/80.  Reports ongoing anxiety and would like to try Prozac.  Has had multiple other medication failures including BuSpar, Vistaril, Lexapro.  She cites ongoing anxiety related to caring for her adult special needs son and other strained family relationships.  Reports upper respiratory infection with productive cough and increased difficulty for a week or so.  History of COPD, on Anoro.   Has chronic back pain and does not want to return to pain management.    Denies chest pain or palpitations.    Allergies and Meds: 274}     Review of patient's allergies indicates:   Allergen Reactions    Opioids - morphine analogues Nausea And Vomiting        Current Outpatient Medications   Medication Sig Dispense Refill    albuterol (PROVENTIL/VENTOLIN HFA) 90 mcg/actuation inhaler Inhale 2 puffs into the lungs every 4 (four) hours as needed for Wheezing or Shortness of Breath. Rescue 18 g 5    ANORO ELLIPTA 62.5-25 mcg/actuation DsDv Inhale 1 puff into the lungs once daily. 90 each 3    cyclobenzaprine (FLEXERIL) 5 MG tablet Take 1 tablet (5 mg total) by mouth 2 (two) times daily as needed for Muscle spasms. 60 tablet 2    gabapentin (NEURONTIN) 300 MG capsule Take 1 capsule (300 mg total) by mouth  "3 (three) times daily. 270 capsule 1    lisinopriL (PRINIVIL,ZESTRIL) 20 MG tablet Take 1 tablet (20 mg total) by mouth 2 (two) times daily. 180 tablet 3    promethazine (PHENERGAN) 25 MG tablet Take 1 tablet (25 mg total) by mouth every 6 (six) hours as needed for Nausea. 60 tablet 2     No current facility-administered medications for this visit.       Labs:274}   I have reviewed labs below:    Lab Results   Component Value Date    WBC 8.97 08/21/2024    RBC 4.29 08/21/2024    HGB 13.5 08/21/2024    HCT 42.2 08/21/2024     08/21/2024     08/21/2024    K 4.0 08/21/2024     (H) 08/21/2024    CALCIUM 9.6 08/21/2024    GLU 90 08/21/2024    BUN 28 (H) 08/21/2024    CREATININE 0.78 08/21/2024    EGFRNONAA 89 12/20/2021    ALT 20 08/21/2024    AST 15 08/21/2024    INR 0.97 07/19/2021    CHOL 198 08/21/2024    TRIG 140 08/21/2024    HDL 56 08/21/2024    LDLCALC 114 08/21/2024    TSH 1.030 08/21/2024    HGBA1C 5.7 08/21/2024       Medical History: 274}     Past Medical History:   Diagnosis Date    Anxiety     Colitis     COPD with chronic bronchitis and emphysema     Diverticulosis of large intestine without perforation or abscess without bleeding 03/24/2023    See Dr. Pendleton C-scope report    Hyperlipidemia     Hypertension     Lumbar disc disease     Nicotine dependence, cigarettes, uncomplicated     Noninfectious gastroenteritis 03/24/2023    See Dr. Pendleton C-scope Report    Pneumonia     Ulcerative colitis     Dr. Pendleton      Tobacco Use History[1]   Past Surgical History:   Procedure Laterality Date    SHOULDER OPEN ROTATOR CUFF REPAIR      Left shoulder, Dr. Penn     Objective:  274}   Vital Signs  Temp: 97.5 °F (36.4 °C)  Temp Source: Oral  Pulse: 72  Resp: 20  SpO2: 95 %  BP: 137/80  BP Location: Right arm  Patient Position: Sitting  Pain Score: 0-No pain  Height and Weight  Height: 5' 1.5" (156.2 cm)  Weight: 45.7 kg (100 lb 12.8 oz)  BSA (Calculated - sq m): 1.41 sq meters  BMI " (Calculated): 18.7  Weight in (lb) to have BMI = 25: 134.2    Over the last two weeks how often have you been bothered by little interest or pleasure in doing things: 0  Over the last two weeks how often have you been bothered by feeling down, depressed or hopeless: 0  PHQ-2 Total Score: 0  PHQ-9 Score: 15  PHQ-9 Interpretation: Moderately Severe    Wt Readings from Last 3 Encounters:   02/26/25 45.7 kg (100 lb 12.8 oz)   08/21/24 43.9 kg (96 lb 12.8 oz)   07/11/23 47.2 kg (104 lb)     Physical Exam  Vitals and nursing note reviewed.   Constitutional:       Appearance: Normal appearance. She is underweight. She is not ill-appearing.   HENT:      Head: Normocephalic.      Right Ear: Tympanic membrane, ear canal and external ear normal.      Left Ear: Tympanic membrane, ear canal and external ear normal.      Nose: Congestion present. No rhinorrhea.      Mouth/Throat:      Mouth: Mucous membranes are moist.      Pharynx: Oropharynx is clear. Uvula midline. Postnasal drip present. No posterior oropharyngeal erythema or uvula swelling.   Eyes:      Conjunctiva/sclera: Conjunctivae normal.   Neck:      Thyroid: No thyromegaly.   Cardiovascular:      Rate and Rhythm: Normal rate and regular rhythm.      Pulses: Normal pulses.      Heart sounds: Normal heart sounds.   Pulmonary:      Effort: Pulmonary effort is normal. No respiratory distress.      Breath sounds: Decreased breath sounds present. No wheezing, rhonchi or rales.   Abdominal:      General: Bowel sounds are normal.      Palpations: Abdomen is soft.      Tenderness: There is no abdominal tenderness. There is no guarding.   Musculoskeletal:      Cervical back: Neck supple.      Right lower leg: No edema.      Left lower leg: No edema.   Lymphadenopathy:      Cervical: No cervical adenopathy.   Skin:     General: Skin is warm and dry.      Coloration: Skin is not jaundiced or pale.   Neurological:      General: No focal deficit present.      Mental Status: She is  alert and oriented to person, place, and time.   Psychiatric:         Attention and Perception: Attention normal.         Mood and Affect: Mood is anxious. Affect is tearful.         Speech: Speech normal.         Behavior: Behavior normal.         Thought Content: Thought content normal.         Cognition and Memory: Cognition normal.         Judgment: Judgment normal.          Assessment and Plan: 274}       1. Benign essential hypertension  Assessment & Plan:  Fairly well controlled, continue lisinopril.    Orders:  -     lisinopriL (PRINIVIL,ZESTRIL) 20 MG tablet; Take 1 tablet (20 mg total) by mouth 2 (two) times daily.  Dispense: 180 tablet; Refill: 3    2. COPD with chronic bronchitis and emphysema  Assessment & Plan:  Stable, continue Anoro daily and albuterol p.r.n.   Will not quit smoking and voices understanding of short and long term risks of continued smoking.  Does not want to see Pulmonary.  Declines LDCT lung screen.    Orders:  -     albuterol (PROVENTIL/VENTOLIN HFA) 90 mcg/actuation inhaler; Inhale 2 puffs into the lungs every 4 (four) hours as needed for Wheezing or Shortness of Breath. Rescue  Dispense: 18 g; Refill: 5  -     ANORO ELLIPTA 62.5-25 mcg/actuation DsDv; Inhale 1 puff into the lungs once daily.  Dispense: 90 each; Refill: 3    3. Ulcerative pancolitis with complication  Overview:  Dr. Pendleton    Assessment & Plan:  Reports GI, Dr. Pendleton, tried her on multiple meds for UC, but the only thing that helps is prednisone which he told her she can't stay on.  UC not controlled - poor appetite, diarrhea off and on, no further weight loss since last visit, is up 4 lbs.      4. Major depressive disorder, single episode, moderate  Assessment & Plan:  Chronic, not controlled, has tried several meds that do not help but would like to try Prozac.  Denies thoughts of harming self or others.    Orders:  -     FLUoxetine 20 MG capsule; Take 1 capsule (20 mg total) by mouth once daily.  Dispense:  90 capsule; Refill: 1    5. Anxiety  -     FLUoxetine 20 MG capsule; Take 1 capsule (20 mg total) by mouth once daily.  Dispense: 90 capsule; Refill: 1    6. Chronic midline low back pain with bilateral sciatica  Overview:  Originally saw spine specialist, Dr. Ward, for chronic LBP & DDD; and he started gabapentin.  Was treated at [facility] for a while.    Assessment & Plan:  Not controlled, but chose not to return to pain management. Norco does not help. Gabapentin helps.     Orders:  -     gabapentin (NEURONTIN) 300 MG capsule; Take 1 capsule (300 mg total) by mouth 3 (three) times daily.  Dispense: 270 capsule; Refill: 1    7. LRTI (lower respiratory tract infection)  -     azithromycin (Z-AARON) 250 MG tablet; Take 2 tablets by mouth on day 1; Take 1 tablet by mouth on days 2-5  Dispense: 6 tablet; Refill: 0  -     dexAMETHasone injection 6 mg  -     cefTRIAXone injection 1 g              Follow up in about 6 months (around 8/26/2025) for anxiety, depression, COPD, hypertension, with fasting labs.    Signature:  MATT Otto-BC    Future Appointments   Date Time Provider Department Center   8/26/2025  3:00 PM Barbie Segundo FNP [facility] [place]   This note was generated with the assistance of ambient listening technology. Verbal consent was obtained by the patient and accompanying visitor(s) for the recording of patient appointment to facilitate this note. I attest to having reviewed and edited the generated note for accuracy, though some syntax or spelling errors may persist. Please contact the author of this note for any clarification.  Ambient listening failed to record and transcribe notes for visit.           [1]   Social History  Tobacco Use   Smoking Status Every Day    Current packs/day: 0.50    Average packs/day: 0.5 packs/day for 63.0 years (31.5 ttl pk-yrs)    Types: Cigarettes    Start date: 2/26/1962   Smokeless Tobacco Never

## 2025-02-27 NOTE — ASSESSMENT & PLAN NOTE
Chronic, not controlled, has tried several meds that do not help but would like to try Prozac.  Denies thoughts of harming self or others.

## 2025-02-27 NOTE — ASSESSMENT & PLAN NOTE
Stable, continue Anoro daily and albuterol p.r.n.   Will not quit smoking and voices understanding of short and long term risks of continued smoking.  Does not want to see Pulmonary.  Declines LDCT lung screen.

## 2025-02-27 NOTE — ASSESSMENT & PLAN NOTE
Reports GI, Dr. Pendleton, tried her on multiple meds for UC, but the only thing that helps is prednisone which he told her she can't stay on.  UC not controlled - poor appetite, diarrhea off and on, no further weight loss since last visit, is up 4 lbs.

## 2025-02-27 NOTE — ASSESSMENT & PLAN NOTE
Not controlled, but chose not to return to pain management. Norco does not help. Gabapentin helps.

## 2025-06-03 ENCOUNTER — TELEPHONE (OUTPATIENT)
Dept: FAMILY MEDICINE | Facility: CLINIC | Age: 80
End: 2025-06-03
Payer: MEDICARE

## 2025-06-03 DIAGNOSIS — F41.9 ANXIETY: Chronic | ICD-10-CM

## 2025-06-03 DIAGNOSIS — F32.1 MAJOR DEPRESSIVE DISORDER, SINGLE EPISODE, MODERATE: Chronic | ICD-10-CM

## 2025-06-03 RX ORDER — FLUOXETINE 20 MG/1
20 CAPSULE ORAL DAILY
Qty: 90 CAPSULE | Refills: 1 | Status: SHIPPED | OUTPATIENT
Start: 2025-06-03

## 2025-06-04 RX ORDER — HYDROXYZINE PAMOATE 25 MG/1
50 CAPSULE ORAL EVERY 6 HOURS PRN
Qty: 60 CAPSULE | Refills: 1 | Status: SHIPPED | OUTPATIENT
Start: 2025-06-04

## 2025-07-02 ENCOUNTER — OFFICE VISIT (OUTPATIENT)
Dept: FAMILY MEDICINE | Facility: CLINIC | Age: 80
End: 2025-07-02
Payer: MEDICARE

## 2025-07-02 VITALS
SYSTOLIC BLOOD PRESSURE: 148 MMHG | RESPIRATION RATE: 19 BRPM | DIASTOLIC BLOOD PRESSURE: 79 MMHG | OXYGEN SATURATION: 94 % | TEMPERATURE: 98 F | WEIGHT: 97.5 LBS | BODY MASS INDEX: 18.12 KG/M2

## 2025-07-02 DIAGNOSIS — F41.9 ANXIETY: Primary | Chronic | ICD-10-CM

## 2025-07-02 DIAGNOSIS — R45.82 FEELING WORRIED: ICD-10-CM

## 2025-07-02 PROCEDURE — 99212 OFFICE O/P EST SF 10 MIN: CPT | Mod: ,,, | Performed by: FAMILY MEDICINE

## 2025-07-02 RX ORDER — BUPROPION HYDROCHLORIDE 150 MG/1
150 TABLET ORAL DAILY
COMMUNITY

## 2025-07-06 NOTE — PROGRESS NOTES
Subjective     Patient ID: Tawny Rutledge is a 79 y.o. female.    Chief Complaint: Follow-up (Just here to talk to MD about care)    78 yo WF here to talk about worry over her son that is in senior care in [place]. She does not know what to do and the  has not been helpful she says. The bail bonds lady has seemed to want to help.     Follow-up  Pertinent negatives include no abdominal pain, chest pain or headaches.     Review of Systems   Constitutional:  Negative for activity change.   Eyes:  Negative for visual disturbance.   Respiratory:  Negative for shortness of breath.    Cardiovascular:  Negative for chest pain.   Gastrointestinal:  Negative for abdominal pain.   Neurological:  Negative for headaches.   Psychiatric/Behavioral:  Positive for dysphoric mood. The patient is nervous/anxious.           Objective   Blood pressure (!) 148/79, temperature 97.5 °F (36.4 °C), temperature source Temporal, resp. rate 19, weight 44.2 kg (97 lb 8 oz), SpO2 (!) 94%.    Physical Exam  Constitutional:       Appearance: Normal appearance.   HENT:      Head: Normocephalic and atraumatic.      Nose: Nose normal.      Mouth/Throat:      Mouth: Mucous membranes are moist.   Eyes:      Pupils: Pupils are equal, round, and reactive to light.   Cardiovascular:      Rate and Rhythm: Normal rate.      Pulses: Normal pulses.   Pulmonary:      Effort: Pulmonary effort is normal.   Abdominal:      General: Abdomen is flat.   Skin:     General: Skin is warm and dry.   Neurological:      General: No focal deficit present.      Mental Status: She is alert and oriented to person, place, and time.   Psychiatric:         Behavior: Behavior normal.         Thought Content: Thought content normal.         Judgment: Judgment normal.      Comments: Anxious and somewhat confused about what to do next. Says she has no family to help her.            Assessment and Plan     1. Anxiety  -     Ambulatory referral/consult to Social Work; Future;  Expected date: 07/13/2025    2. Feeling worried  -     Ambulatory referral/consult to Social Work; Future; Expected date: 07/13/2025                   Follow up if symptoms worsen or fail to improve.